# Patient Record
Sex: MALE | NOT HISPANIC OR LATINO | URBAN - METROPOLITAN AREA
[De-identification: names, ages, dates, MRNs, and addresses within clinical notes are randomized per-mention and may not be internally consistent; named-entity substitution may affect disease eponyms.]

---
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
## 2022-02-12 VITALS
DIASTOLIC BLOOD PRESSURE: 62 MMHG | WEIGHT: 185.63 LBS | HEART RATE: 75 BPM | TEMPERATURE: 98.8 F | BODY MASS INDEX: 28.04 KG/M2 | WEIGHT: 185 LBS | HEIGHT: 68 IN | OXYGEN SATURATION: 99 % | HEIGHT: 68 IN | SYSTOLIC BLOOD PRESSURE: 108 MMHG | HEART RATE: 88 BPM | WEIGHT: 185 LBS | DIASTOLIC BLOOD PRESSURE: 82 MMHG | BODY MASS INDEX: 28.04 KG/M2 | BODY MASS INDEX: 28.13 KG/M2 | OXYGEN SATURATION: 98 % | SYSTOLIC BLOOD PRESSURE: 122 MMHG | HEIGHT: 68 IN | HEART RATE: 80 BPM

## 2022-02-12 VITALS
OXYGEN SATURATION: 96 % | HEART RATE: 70 BPM | HEIGHT: 67 IN | HEIGHT: 67 IN | WEIGHT: 205 LBS | SYSTOLIC BLOOD PRESSURE: 110 MMHG | DIASTOLIC BLOOD PRESSURE: 70 MMHG | HEIGHT: 68 IN | OXYGEN SATURATION: 97 % | HEART RATE: 101 BPM | HEART RATE: 58 BPM | OXYGEN SATURATION: 99 % | WEIGHT: 199.74 LBS | BODY MASS INDEX: 31.35 KG/M2 | TEMPERATURE: 98.8 F | BODY MASS INDEX: 29.1 KG/M2 | DIASTOLIC BLOOD PRESSURE: 70 MMHG | BODY MASS INDEX: 32.18 KG/M2 | TEMPERATURE: 97.7 F | WEIGHT: 192 LBS | SYSTOLIC BLOOD PRESSURE: 104 MMHG

## 2022-02-12 VITALS
WEIGHT: 192.8 LBS | HEIGHT: 68 IN | DIASTOLIC BLOOD PRESSURE: 70 MMHG | TEMPERATURE: 97.8 F | BODY MASS INDEX: 30.26 KG/M2 | SYSTOLIC BLOOD PRESSURE: 110 MMHG | OXYGEN SATURATION: 97 % | OXYGEN SATURATION: 98 % | HEART RATE: 83 BPM | TEMPERATURE: 97.6 F | HEART RATE: 71 BPM | BODY MASS INDEX: 30.2 KG/M2 | HEIGHT: 67 IN | WEIGHT: 199.3 LBS

## 2022-02-12 VITALS
SYSTOLIC BLOOD PRESSURE: 100 MMHG | WEIGHT: 183.86 LBS | SYSTOLIC BLOOD PRESSURE: 118 MMHG | DIASTOLIC BLOOD PRESSURE: 64 MMHG | HEART RATE: 61 BPM | TEMPERATURE: 97.8 F | HEIGHT: 68 IN | BODY MASS INDEX: 27.93 KG/M2 | HEIGHT: 68 IN | WEIGHT: 184.3 LBS | TEMPERATURE: 98.2 F | BODY MASS INDEX: 27.87 KG/M2 | HEART RATE: 69 BPM | DIASTOLIC BLOOD PRESSURE: 64 MMHG

## 2022-02-12 VITALS
HEART RATE: 114 BPM | WEIGHT: 209.44 LBS | WEIGHT: 205.47 LBS | SYSTOLIC BLOOD PRESSURE: 116 MMHG | DIASTOLIC BLOOD PRESSURE: 70 MMHG | BODY MASS INDEX: 31.14 KG/M2 | HEART RATE: 84 BPM | OXYGEN SATURATION: 93 % | BODY MASS INDEX: 31.74 KG/M2 | HEIGHT: 68 IN | DIASTOLIC BLOOD PRESSURE: 80 MMHG | TEMPERATURE: 98.4 F | HEIGHT: 68 IN | SYSTOLIC BLOOD PRESSURE: 120 MMHG

## 2022-02-12 VITALS
HEIGHT: 67 IN | DIASTOLIC BLOOD PRESSURE: 64 MMHG | TEMPERATURE: 97.6 F | WEIGHT: 205.03 LBS | BODY MASS INDEX: 32.18 KG/M2 | SYSTOLIC BLOOD PRESSURE: 118 MMHG

## 2022-02-12 VITALS
TEMPERATURE: 98.2 F | SYSTOLIC BLOOD PRESSURE: 128 MMHG | BODY MASS INDEX: 30.55 KG/M2 | HEART RATE: 78 BPM | DIASTOLIC BLOOD PRESSURE: 80 MMHG | HEIGHT: 67 IN | WEIGHT: 194.67 LBS

## 2022-02-15 NOTE — NURSING NOTE
Generalized Anxiety Disorder Screening Entered On:  3/22/2019 11:38 AM CDT    Performed On:  3/22/2019 11:38 AM CDT by Chapis Felix CMA               Generalized Anxiety Disorder Screening   WILLIE Nervous, Anxious On Edge :   Several days   WILLIE Control Worrying B :   More than half the days   WILLIE Worrying Too Much :   More than half the days   WILLIE Restless :   More than half the days   WILLIE Easily Annoyed/Irritable :   More than half the days   WILLIE Afraid :   Nearly every day   WILLIE Trouble Relaxing :   Nearly every day   WILLIE Total Screening Score :   15    WILLIE Difficulty with Work, Home, Others :   Somewhat difficult   Chapis Felix CMA - 3/22/2019 11:38 AM CDT

## 2022-02-15 NOTE — PROGRESS NOTES
Patient:   MARY BETH DENSON            MRN: [id number]            FIN: [id number]               Age:   19 years     Sex:  Male     :  2000   Associated Diagnoses:   Acute urticaria   Author:   Gerardo Yost MD      Impression and Plan   Diagnosis     Acute urticaria (NQM61-EC L50.8).     Course:  Worsening.    Plan:    1. Observe for possible allergy exposures  2. Diphenhydramine or Hydroxyzine PRN for itching  3. Follow up for allergy consult if case becomes chronic (greater than two weeks)  4. Go to ER if respiratory or life threatening symptoms develop.    Orders     Orders   Charges (Evaluation and Management):  78189 office outpatient visit 15 minutes (Charge) (Order): Quantity: 1, Acute urticaria.     Orders (Selected)   Prescriptions  Prescribed  betamethasone valerate 0.1% topical cream: 1 shantal, TOP, BID, # 45 g, 1 Refill(s), Type: Maintenance, Pharmacy: [facility], 1 shantal Topical bid  hydrOXYzine hydrochloride 25 mg oral tablet: = 1 tab(s) ( 25 mg ), Oral, q4-6 hrs, PRN: as needed for itching, # 25 tab(s), 1 Refill(s), Type: Maintenance, Pharmacy: [facility], 1 tab(s) Oral q4-6 hrs,PRN:as needed for itching.        Visit Information      Date of Service: 10/01/2019 09:40 am  Performing Location: [facility]  Encounter#: [id number]      Primary Care Provider (PCP):  Gerardo Yost MD    NPI# [id number]      Referring Provider:  Gerardo Yost MD, NPI# [id number]   Visit type:  New symptom.    Accompanied by:  Mother.    Source of history:  Self, Mother.    History limitation:  None.       Chief Complaint   10/1/2019 9:48 AM CDT    Pt here for rash/hives        History of Present Illness             The patient presents with generalized hives.  Exacerbating factors consist of allergen exposure.  Relieving factors consist of antihistamine and cortisone cream.  Associated symptoms consist of none.        Review of Systems   Constitutional:  Negative.    Eye:  Negative.     Ear/Nose/Mouth/Throat:  Negative.    Respiratory:  Negative.    Cardiovascular:  Negative.    Gastrointestinal:  Negative.    Genitourinary:  Negative.    Hematology/Lymphatics:  Negative.    Endocrine:  Negative.    Immunologic:  Negative.    Musculoskeletal:  Negative.    Integumentary:  Negative except as documented in history of present illness.    Neurologic:  Negative.    Psychiatric:  Negative.    All other systems reviewed and negative      Health Status   Allergies:    Allergic Reactions (Selected)  Severity Not Documented  Augmentin (Hives)   Medications:  (Selected)   Prescriptions  Prescribed  Wellbutrin  mg/24 hours oral tablet, extended release: = 1 tab(s) ( 150 mg ), Oral, q 24 hrs, # 30 tab(s), 1 Refill(s), Type: Maintenance, Pharmacy: [facility], 1 tab(s) Oral q 24 hrs  betamethasone valerate 0.1% topical cream: 1 shantal, TOP, BID, # 45 g, 1 Refill(s), Type: Maintenance, Pharmacy: [facility], 1 shantal Topical bid  hydrOXYzine hydrochloride 25 mg oral tablet: = 1 tab(s) ( 25 mg ), Oral, q4-6 hrs, PRN: as needed for itching, # 25 tab(s), 1 Refill(s), Type: Maintenance, Pharmacy: [facility], 1 tab(s) Oral q4-6 hrs,PRN:as needed for itching   Problem list:    All Problems  Obesity / SNOMED CT 6904035472 / Probable  Severe major depressive disorder / SNOMED CT 3999417881 / Confirmed      Histories   Past Medical History:    No active or resolved past medical history items have been selected or recorded.   Family History:    No family history items have been selected or recorded.   Procedure history:    No active procedure history items have been selected or recorded.   Social History:             No active social history items have been recorded.      Physical Examination   Vital Signs   10/1/2019 9:48 AM CDT Temperature Tympanic 97.7 DegF  LOW    Peripheral Pulse Rate 58 bpm    Oxygen Saturation 99 %      Measurements from flowsheet : Measurements   10/1/2019 9:48 AM CDT Height  Measured - Standard 67 in    Weight Measured - Standard 205 lb    BSA 2.09 m2    Body Mass Index 32.1 kg/m2    Body Mass Index Percentile 98.31    Height/Length Percentile 0.00      General:  Alert and oriented X 3, No acute distress, Warm, Pink, Intact.         Appearance: Within normal limits, Well nourished, Calm.         Hydration: Within normal limits.         Psych: Within normal limits, Appropriate mood and affect, Cooperative, Normal judgment.    Integumentary:  scattered wheel and flare lesions over entire body.

## 2022-02-15 NOTE — LETTER
(Inserted Image. Unable to display)   March 20, 2019      MARY BETH DENSON       [place], [place] [id number]        Dear MARY BETH,    Thank you for selecting [facility] for your healthcare needs.  Below you will find the results of your recent tests done at our clinic.      Mary Beth's labs are acceptable.  His triglycerides are a bit high, so I would recommend he start over the counter fish oil supplement 2 grams per day.  His HDL is a bit low- this is the good cholesterol so we want it a bit higher- the best way to do this is get more physical activity.        Result Name Current Result Reference Range   Hgb A1c  5.6 3/19/2019  - <5.7   Cholesterol (mg/dL)  161 3/19/2019  - <170   Non-HDL Cholesterol ((H)) 127 3/19/2019  - <120   HDL (mg/dL) ((L)) 34 3/19/2019 >45 -    Cholesterol/HDL Ratio  4.7 3/19/2019  - <5.0   LDL  101 3/19/2019  - <110   Triglyceride (mg/dL) ((H)) 154 3/19/2019  - <90   T4 Free (ng/dL)  0.9 3/19/2019 0.8 - 1.4   TSH (mIU/L)  3.78 3/19/2019 0.50 - 4.30       Please contact me or my assistant at [phone] if you have any questions.     Sincerely,        Gosia Tracey MD

## 2022-02-15 NOTE — PROGRESS NOTES
Patient:   MARY BETH DENSON            MRN: [id number]            FIN: [id number]               Age:   18 years     Sex:  Male     :  2000   Associated Diagnoses:   Severe major depressive disorder   Author:   Gosia Tracey MD      Chief Complaint   6/3/2019 2:11 PM CDT     Patient presents for depression medication check and renewal of medication.      History of Present Illness   Chief complaint and symptoms as noted above and confirmed with patient.  Here today with mom.  Has been off medication for about a week.  Mom notices off the medication patience is lower.  Is more angry.  Sleep without the medication is better.  Doesn't like the morning effects.  Takes the Remeron- hasn't been taking for a long time.  Was taking at 9:30pm or 10:30pm.  Without Remeron does not have troubles falling asleep or staying asleep.  Bedtime is at 10pm.  Up for school 725 am.  Sat [place] [employer].  Sometimes not getting into bed 12am.  Does have troubles falling asleep- has always been the case.  Normally does not dinner.  Just eats lunch.  This past Friday cut on his arm with a razor blade.  Mom administers medication.  No access to firearms.      Review of Systems   All other systems are negative      Health Status   Allergies:    Allergic Reactions (Selected)  Severity Not Documented  Augmentin (Hives)   Medications:  (Selected)   Prescriptions  Prescribed  mirtazapine 15 mg oral tablet: = 1 tab(s) ( 15 mg ), PO, Once a day (at bedtime), # 30 tab(s), 1 Refill(s), Type: Maintenance, Pharmacy: [facility], 1 tab(s) Oral hs  venlafaxine 75 mg oral capsule, extended release: = 1 cap(s) ( 75 mg ), PO, Daily, # 30 cap(s), 1 Refill(s), Type: Maintenance, Pharmacy: [facility], 1 cap(s) Oral daily   Problem list:    All Problems  Obesity / 3744877103 / Probable  Severe major depressive disorder / 0323336688 / Confirmed      Histories   Past Medical History:    No active or resolved past medical history items  have been selected or recorded.   Family History:    No family history items have been selected or recorded.   Procedure history:    No active procedure history items have been selected or recorded.   Social History:             No active social history items have been recorded.      Physical Examination   Vital Signs   6/3/2019 2:11 PM CDT Temperature Tympanic 98.4 DegF    Peripheral Pulse Rate 84 bpm    HR Method Electronic    Systolic Blood Pressure 120 mmHg    Diastolic Blood Pressure 70 mmHg    Mean Arterial Pressure 87 mmHg    BP Site Right arm    BP Method Manual      Measurements from flowsheet : Measurements   6/3/2019 2:11 PM CDT Height Measured - Metric 171.45 cm    Weight Measured - Metric 95 kg    BSA - Metric 2.13 m2    Body Mass Index - Metric 32.32 kg/m2    Body Mass Index Percentile 98.47      Vital signs as noted above   General:  Alert and oriented.    Respiratory:  Lungs clear to auscultation bilaterally.  Equal air entry.  Symmetrical chest expansion.  No wheezing.  .    Cardiovascular:  S1 and S2 with regular rate and rhythm.  No murmurs.  Pulses 2+ in all four extremities.  Brisk capillary refill.  .    Psychiatric:  Cooperative, Appropriate mood & affect, Normal judgment, Non-suicidal, Good eye contact.       Review / Management   PHQ-A:   9/27.  Questions 1,2,9 1 each.       Impression and Plan   Diagnosis     Severe major depressive disorder (KQU54-KG F32.2).     Plan:  Discussed I need him to be more consistent with taking his medication and knowing what time he takes it to be able to adjust timing of it so he is not tired in the morning.  Recommend daily dinner meals with parents at the table and administering Remeron at that meal.  Should then document what time he begins to feel tired.  Do this consistently for at least a week and family may call for an update otherwise over the next month collect data and return to clinic in 1 month for recheck.  Consider sleep study if symptoms are  ongoing.  Continue Remeron 15 mg at bedtime.  Continue venlafaxine 75 mg daily in the morning.  Refill sent to the pharmacy and he should return in 4 weeks for recheck, sooner if needed.   .    Orders     Orders (Selected)   Prescriptions  Prescribed  mirtazapine 15 mg oral tablet: = 1 tab(s) ( 15 mg ), PO, Once a day (at bedtime), # 30 tab(s), 1 Refill(s), Type: Maintenance, Pharmacy: [facility], 1 tab(s) Oral hs  venlafaxine 75 mg oral capsule, extended release: = 1 cap(s) ( 75 mg ), PO, Daily, # 30 cap(s), 1 Refill(s), Type: Maintenance, Pharmacy: [facility], 1 cap(s) Oral daily.

## 2022-02-15 NOTE — PROGRESS NOTES
Patient:   MARY BETH DENSON            MRN: [id number]            FIN: [id number]               Age:   19 years     Sex:  Male     :  2000   Associated Diagnoses:   Moderate persistent asthma; Severe major depressive disorder; Tobacco user   Author:   Gosia Tracey MD      Chief Complaint   1/10/2020 1:48 PM CST    Pt here today for med check.      History of Present Illness   Chief complaint and symptoms as noted above and confirmed with patient.  Here today with mom for follow-up.    1.  Has had some ongoing cough at night.  Is at least 2 times per week.  Has been participating in wrestling and noted he needs to use his albuterol more frequently.  Has a history of asthma.  Is also smoking tobacco products.  Smokes about 3 cigarettes/day.  States it is not always daily.  Cannot name why he does it.    2. From a depression standpoint has run out of his Wellbutrin.  Has been off of this for a little while.  Did think it was making a difference.  Grades are excellent right now A s and B s.  Does not use the hydroxyzine at all as he does not like that it makes him tired.  Is going to bed between 1030 and 11.  No troubles falling asleep or staying asleep.  Just gets to bed late related to homework/ wrestling and not going to bed as early as he should.              Review of Systems   Constitutional:  Negative.    Eye:  Negative.    Ear/Nose/Mouth/Throat:  Negative.    Respiratory:  Negative except as documented in history of present illness.    Cardiovascular:  Negative.    Gastrointestinal:  Negative.    Psychiatric:  Negative except as documented in history of present illness.       Health Status   Allergies:    Allergic Reactions (Selected)  Severity Not Documented  Augmentin (Hives)   Medications:  (Selected)   Prescriptions  Prescribed  ProAir HFA 90 mcg/inh inhalation aerosol: 2 puff(s), inh, qid, # 1 EA, 5 Refill(s), Type: Maintenance, Pharmacy: [facility], 2 puff(s) Inhale qid  Wellbutrin   mg/24 hours oral tablet, extended release: = 1 tab(s) ( 300 mg ), Oral, q 24 hrs, # 30 tab(s), 1 Refill(s), Type: Maintenance, Pharmacy: [facility], 1 tab(s) Oral q 24 hrs  hydrOXYzine hydrochloride 25 mg oral tablet: = 1 tab(s) ( 25 mg ), Oral, q4-6 hrs, PRN: as needed for itching, # 25 tab(s), 1 Refill(s), Type: Maintenance, Pharmacy: [facility], 1 tab(s) Oral q4-6 hrs,PRN:as needed for itching   Problem list:    All Problems  Obesity / 6450038242 / Probable  Severe major depressive disorder / 0473384679 / Confirmed      Histories   Past Medical History:    No active or resolved past medical history items have been selected or recorded.   Family History:    No family history items have been selected or recorded.   Procedure history:    No active procedure history items have been selected or recorded.   Social History:             No active social history items have been recorded.      Physical Examination   Vital Signs   1/10/2020 1:48 PM CST Peripheral Pulse Rate 88 bpm    HR Method Manual    Systolic Blood Pressure 108 mmHg    Diastolic Blood Pressure 82 mmHg    Mean Arterial Pressure 91 mmHg    BP Site Right arm    BP Method Manual      Measurements from flowsheet : Measurements   1/10/2020 1:48 PM CST Height Measured - Metric 171.5 cm    Weight Measured - Metric 84.2 kg    BSA - Metric 2 m2    Body Mass Index - Metric 28.63 kg/m2    Body Mass Index Percentile 95.13      Vital signs as noted above   General:  Alert and oriented.    Eye:  Pupils are equal, round and reactive to light, Extraocular movements are intact.    HENT:  Tympanic membranes are clear, Oral mucosa is moist, No pharyngeal erythema.    Neck:  No lymphadenopathy.    Respiratory:  Lungs clear to auscultation bilaterally.  Equal air entry.  Symmetrical chest expansion.  No wheezing.  .    Cardiovascular:  S1 and S2 with regular rate and rhythm.  No murmurs.  Pulses 2+ in all four extremities.  Brisk capillary refill.  .        Review / Management   ACT: 10 total, no hospitalizations or ER visits.  Robinson 7: 15, very difficult.  PHQ A: 15/27.  Spirometry: FEV1\ FVC: 74%.  FEF 25-75%: 69% of predicted.  Abnormal flow volume loop.      Impression and Plan   Diagnosis     Moderate persistent asthma (GBE01-KH J45.40).     Severe major depressive disorder (RKI96-GK F32.2).     Tobacco user (INX18-XO Z72.0).     Plan:  We will start Advair Diskus twice daily.  Should decrease albuterol use as able.  Recommend so smoking cessation.  We will discontinue use of hydroxyzine as he has not found it helpful.  Continue Wellbutrin 300 mg XR.  Return to clinic in 2 months for recheck, sooner if needed..    Orders     Orders (Selected)   Prescriptions  Prescribed  Advair Diskus 250 mcg-50 mcg inhalation powder: 1 puff(s), Inhale, bid, # 180 EA, 2 Refill(s), Type: Maintenance, Pharmacy: [facility], 1 puff(s) Inhale bid  Wellbutrin  mg/24 hours oral tablet, extended release: = 1 tab(s) ( 300 mg ), Oral, q 24 hrs, # 30 tab(s), 3 Refill(s), Type: Maintenance, Pharmacy: [facility], 1 tab(s) Oral q 24 hrs.

## 2022-02-15 NOTE — PROGRESS NOTES
Patient:   MARY BETH DENSON            MRN: [id number]            FIN: [id number]               Age:   19 years     Sex:  Male     :  2000   Associated Diagnoses:   Impetigo contagiosa   Author:   Gerardo Yost MD      Impression and Plan   Diagnosis     Impetigo contagiosa (UPD84-UK L01.00).     Course:  Worsening.    Orders     Orders   Charges (Evaluation and Management):  98926 office outpatient visit 15 minutes (Charge) (Order): Quantity: 1, Impetigo contagiosa.     Orders (Selected)   Prescriptions  Prescribed  cephalexin 500 mg oral capsule: = 1 cap(s) ( 500 mg ), PO, tid, # 30 cap(s), 0 Refill(s), Type: Maintenance, Pharmacy: [facility], 1 cap(s) Oral tid,x10 day(s)  mupirocin 2% topical cream: 1 shantal, Topical, tid, # 30 gm, 0 Refill(s), Type: Acute, Pharmacy: [facility], 1 shantal Topical tid.        Visit Information      Date of Service: 2020 04:14 pm  Performing Location: [facility]  Encounter#: [id number]      Primary Care Provider (PCP):  Gerardo Yost MD    NPI# [id number]      Referring Provider:  Gerardo Yost MD, NPI# [id number]   Visit type:  New symptom.    Accompanied by:  Family member.    Source of history:  Self, Family member.    History limitation:  None.       Chief Complaint   2020 4:15 PM CST    Pt here for rash        History of Present Illness             The patient presents with skin lesion(s).  The location of the skin lesion is the right face.  The skin lesion is described as a single lesion, red, oozing tenderness.  The severity of symptom(s) associated to the skin lesion is mild.  The patient noticed the skin lesion 1 day(s) ago.  The skin lesion is worsening.  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  There are no modifying factors.        Review of Systems   Constitutional:  Negative.    Eye:  Negative.    Ear/Nose/Mouth/Throat:  Negative.    Respiratory:  Negative.     Cardiovascular:  Negative.    Gastrointestinal:  Negative.    Genitourinary:  Negative.    Hematology/Lymphatics:  Negative.    Endocrine:  Negative.    Immunologic:  Negative.    Musculoskeletal:  Negative.    Integumentary:  Negative except as documented in history of present illness.    Neurologic:  Negative.    Psychiatric:  Negative.    All other systems reviewed and negative      Health Status   Allergies:    Allergic Reactions (Selected)  Severity Not Documented  Augmentin (Hives)   Medications:  (Selected)   Prescriptions  Prescribed  Advair Diskus 250 mcg-50 mcg inhalation powder: 1 puff(s), Inhale, bid, # 180 EA, 2 Refill(s), Type: Maintenance, Pharmacy: [facility], 1 puff(s) Inhale bid  ProAir HFA 90 mcg/inh inhalation aerosol: 2 puff(s), inh, qid, # 1 EA, 5 Refill(s), Type: Maintenance, Pharmacy: [facility], 2 puff(s) Inhale qid  Wellbutrin  mg/24 hours oral tablet, extended release: = 1 tab(s) ( 300 mg ), Oral, q 24 hrs, # 30 tab(s), 3 Refill(s), Type: Maintenance, Pharmacy: [facility], 1 tab(s) Oral q 24 hrs  cephalexin 500 mg oral capsule: = 1 cap(s) ( 500 mg ), PO, tid, # 30 cap(s), 0 Refill(s), Type: Maintenance, Pharmacy: [facility], 1 cap(s) Oral tid,x10 day(s)  mupirocin 2% topical cream: 1 shantal, Topical, tid, # 30 gm, 0 Refill(s), Type: Acute, Pharmacy: [facility], 1 shantal Topical tid   Problem list:    All Problems  Moderate persistent asthma / SNOMED CT 5680830790 / Confirmed  Obesity / SNOMED CT 8835294569 / Probable  Severe major depressive disorder / SNOMED CT 4024771979 / Confirmed  Tobacco user / SNOMED CT 001268101 / Confirmed      Histories   Past Medical History:    No active or resolved past medical history items have been selected or recorded.   Family History:    No family history items have been selected or recorded.   Procedure history:    No active procedure history items have been selected or recorded.   Social History:             No  active social history items have been recorded.      Physical Examination   Vital Signs   1/29/2020 4:15 PM CST Temperature Tympanic 98.8 DegF    Peripheral Pulse Rate 80 bpm    Oxygen Saturation 98 %      Measurements from flowsheet : Measurements   1/29/2020 4:15 PM CST Height Measured - Standard 68 in    Weight Measured - Standard 185 lb    BSA 2 m2    Body Mass Index 28.13 kg/m2    Body Mass Index Percentile 94.18      General:  Alert and oriented X 3, No acute distress, Warm, Pink, Intact.         Appearance: Within normal limits, Well nourished, Calm.         Hydration: Within normal limits.         Psych: Within normal limits, Appropriate mood and affect, Cooperative, Normal judgment.    Integumentary:  pustular skin lesion in right eyebrow consistent with bacterial infection.

## 2022-02-15 NOTE — NURSING NOTE
Generalized Anxiety Disorder Screening Entered On:  10/9/2019 8:09 AM CDT    Performed On:  10/4/2019 8:08 AM CDT by Gladys Romero               Generalized Anxiety Disorder Screening   WILLIE Nervous, Anxious On Edge :   More than half the days   WILLIE Control Worrying B :   Several days   WILLIE Worrying Too Much :   More than half the days   WILLIE Restless :   More than half the days   WILLIE Easily Annoyed/Irritable :   Several days   WILLIE Afraid :   Several days   WILLIE Trouble Relaxing :   Nearly every day   WILLIE Total Screening Score :   12    WILLIE Difficulty with Work, Home, Others :   Very difficult   Gladys Romero - 10/9/2019 8:08 AM CDT

## 2022-02-15 NOTE — NURSING NOTE
Depression Screening Entered On:  8/16/2019 9:00 AM CDT    Performed On:  8/13/2019 9:00 AM CDT by Gladys Romero               Depression Screening   Little Interest - Pleasure in Activities :   More than half the days   Feeling Down, Depressed, Hopeless :   More than half the days   Initial Depression Screen Score :   4    Trouble Falling or Staying Asleep :   More than half the days   Feeling Tired or Little Energy :   Several days   Poor Appetite or Overeating :   Nearly every day   Feeling Bad About Yourself :   Nearly every day   Trouble Concentrating :   Several days   Moving or Speaking Slowly :   Not at all   Thoughts Better Off Dead or Hurting Self :   Several days   Detailed Depression Screen Score :   11    Total Depression Screen Score :   15    Gladys Romero - 8/16/2019 9:00 AM CDT

## 2022-02-15 NOTE — NURSING NOTE
Depression Screening Entered On:  6/6/2019 3:52 PM CDT    Performed On:  6/3/2019 3:48 PM CDT by Gladys Romero               Depression Screening   Little Interest - Pleasure in Activities :   Several days   Feeling Down, Depressed, Hopeless :   Several days   Initial Depression Screen Score :   2    Trouble Falling or Staying Asleep :   More than half the days   Poor Appetite or Overeating :   More than half the days   Feeling Bad About Yourself :   Several days   Trouble Concentrating :   Several days   Moving or Speaking Slowly :   Not at all   Thoughts Better Off Dead or Hurting Self :   Several days   Gladys Romero - 6/6/2019 3:48 PM CDT

## 2022-02-15 NOTE — NURSING NOTE
Generalized Anxiety Disorder Screening Entered On:  2/25/2019 11:31 AM CST    Performed On:  2/22/2019 11:29 AM CST by Norma Caballero               Generalized Anxiety Disorder Screening   WILLIE Nervous, Anxious On Edge :   More than half the days   WILLIE Control Worrying B :   More than half the days   WILLIE Worrying Too Much :   More than half the days   WILLIE Restless :   More than half the days   WILLIE Easily Annoyed/Irritable :   Several days   WILLIE Afraid :   More than half the days   WILLIE Trouble Relaxing :   More than half the days   WILLIE Total Screening Score :   13    WILLIE Difficulty with Work, Home, Others :   Somewhat difficult   Norma Caballero - 2/25/2019 11:29 AM CST

## 2022-02-15 NOTE — PROGRESS NOTES
Patient:   MARY BETH DENSON            MRN: [id number]            FIN: [id number]               Age:   19 years     Sex:  Male     :  2000   Associated Diagnoses:   Smoker; Severe major depressive disorder; Immunization due   Author:   Gosia Tracey MD      Chief Complaint   10/4/2019 2:23 PM CDT    2 week follow up. feels it is still the same. could still be better.      History of Present Illness   Chief complaint and symptoms as noted above and confirmed with patient.  Here today with mom.  Mom thought was a rough switch.  He had some cutting.  Since then seems to have mellowed out but still feels it could be better. Is using the hydroxyzine every other day or some days.  Sometimes this makes him tired.  Therapist visits at school and hasn't shown up yet.  Next Tuesday will see him again at school.  Not hanging out much with friends.  Tobias his cousin are friends.  Has a second job [employer].  Is a senior this year.  Has talked about [employer] or tech school when he is done.    Sleep is going well once he falls asleep.  Does snore.  Feels tired during the school day, falling asleep in English class regularly.    Is often not hungry and won't eat with mom.   Still smoking cigarettes.        Review of Systems   Constitutional:  Negative.    Eye:  Negative.    Ear/Nose/Mouth/Throat:  Negative.    Respiratory:  Negative.    Cardiovascular:  Negative.    Integumentary:  Recent case of hives, saw Dr. Yost and these are now improved.    Psychiatric:  Negative except as documented in history of present illness.       Health Status   Allergies:    Allergic Reactions (Selected)  Severity Not Documented  Augmentin (Hives)   Medications:  (Selected)   Prescriptions  Prescribed  Wellbutrin  mg/24 hours oral tablet, extended release: = 1 tab(s) ( 150 mg ), Oral, q 24 hrs, # 30 tab(s), 1 Refill(s), Type: Maintenance, Pharmacy: [facility], 1 tab(s) Oral q 24 hrs  betamethasone valerate 0.1% topical cream: 1  shantal, TOP, BID, # 45 g, 1 Refill(s), Type: Maintenance, Pharmacy: [facility], 1 shantal Topical bid  hydrOXYzine hydrochloride 25 mg oral tablet: = 1 tab(s) ( 25 mg ), Oral, q4-6 hrs, PRN: as needed for itching, # 25 tab(s), 1 Refill(s), Type: Maintenance, Pharmacy: [facility], 1 tab(s) Oral q4-6 hrs,PRN:as needed for itching   Problem list:    All Problems  Obesity / 2239162612 / Probable  Severe major depressive disorder / [id number] / Confirmed      Histories   Past Medical History:    No active or resolved past medical history items have been selected or recorded.   Family History:    No family history items have been selected or recorded.   Procedure history:    No active procedure history items have been selected or recorded.   Social History:             No active social history items have been recorded.      Physical Examination   Vital Signs   10/4/2019 2:23 PM CDT Peripheral Pulse Rate 101 bpm  HI    HR Method Electronic    Systolic Blood Pressure 110 mmHg    Diastolic Blood Pressure 70 mmHg    Mean Arterial Pressure 83 mmHg    BP Site Right arm    BP Method Manual    Oxygen Saturation 96 %      Measurements from flowsheet : Measurements   10/4/2019 2:23 PM CDT Height Measured - Metric 171 cm    Weight Measured - Metric 90.6 kg    BSA - Metric 2.07 m2    Body Mass Index - Metric 30.98 kg/m2      Vital signs as noted above   General:  Alert and oriented, Fair to poor eye contact. Fidgets with hands.    Respiratory:  Lungs clear to auscultation bilaterally.  Equal air entry.  Symmetrical chest expansion.  No wheezing.  .    Cardiovascular:  S1 and S2 with regular rate and rhythm.  No murmurs.  Pulses 2+ in all four extremities.  Brisk capillary refill.  .       Review / Management   Robinson 7: 12, very difficult.  PHQ 9: 18/27.  Question 9 is a 1, question 1 is a 3, question 2 is a 2.      Impression and Plan   Diagnosis     Smoker (USN81-UX F17.200).     Severe major depressive disorder (SOP71-QE  F32.2).     Immunization due (QKK61-YR Z23).     Plan:  We will increase Wellbutrin to 300 mg once daily.  Continue the hydroxyzine as needed.  Encouraged him to have a meal with his mother 1-2 times per week.  Encouraged him to notify his mother where he is going, who he is with, what he will be doing, and when he will return home.  We will have him get in with Dr. Hood for a sleep study.  Influenza vaccine given today.  Return to clinic in 4 weeks for recheck.  .    Orders     Orders (Selected)   Prescriptions  Prescribed  Wellbutrin  mg/24 hours oral tablet, extended release: = 1 tab(s) ( 300 mg ), Oral, q 24 hrs, # 30 tab(s), 1 Refill(s), Type: Maintenance, Pharmacy: [facility], 1 tab(s) Oral q 24 hrs.

## 2022-02-15 NOTE — PROGRESS NOTES
Patient:   MARY BETH DENSON            MRN: [id number]            FIN: [id number]               Age:   18 years     Sex:  Male     :  2000   Associated Diagnoses:   Hospital discharge follow-up; Overweight child with body mass index (BMI) > 99% for age; Severe major depressive disorder   Author:   Gosia Tracey MD      Chief Complaint   3/19/2019 3:41 PM CDT    medication check, labs, follow up hospital.      History of Present Illness   Chief complaint and symptoms as noted above and confirmed with patient.  Here today with mom for follow up.  Was admitted on .  Anxiety was really bad and got very depressed.  Was at [facility] for about a week.  Worked on coping skills and how to stay positive.  Since discharge he states the morning medication doesn't seem to be doing much.  The night medication helps but still having some difficulty falling asleep.  IS drowsy.  Mom thinks could be related to some screen use.  Does try to turn this off for an hour first.  Doing homework until 1130 and then takes medication and then laying down.  Not working this month.  Mom is worried about his thyroid.  Was elevated at the time of admission.    Hypothyroidism- mom, GM and mat aunt.    Still with the same girlfriend.        Review of Systems   All other systems are negative      Health Status   Allergies:    Allergic Reactions (Selected)  Severity Not Documented  Augmentin (Hives)   Medications:  (Selected)   Documented Medications  Documented  mirtazapine 15 mg oral tablet: = 1 tab(s) ( 15 mg ), PO, Once a day (at bedtime), # 30 tab(s), 0 Refill(s), Type: Maintenance  venlafaxine 37.5 mg oral capsule, extended release: = 1 cap(s) ( 37.5 mg ), PO, Daily, # 30 cap(s), 0 Refill(s), Type: Maintenance   Problem list:    All Problems  Obesity / [id number] / Probable      Histories   Past Medical History:    No active or resolved past medical history items have been selected or recorded.   Family History:    No family  history items have been selected or recorded.   Procedure history:    No active procedure history items have been selected or recorded.   Social History:             No active social history items have been recorded.      Physical Examination   Vital Signs   3/19/2019 3:41 PM CDT Temperature Tympanic 97.6 DegF  LOW    Pulse Site Radial artery    HR Method Manual    Systolic Blood Pressure 118 mmHg    Diastolic Blood Pressure 64 mmHg    Mean Arterial Pressure 82 mmHg    BP Site Right arm    BP Method Manual      Measurements from flowsheet : Measurements   3/19/2019 3:41 PM CDT Height Measured - Metric 170 cm    Weight Measured - Metric 93 kg    BSA - Metric 2.1 m2    Body Mass Index - Metric 32.18 kg/m2    Body Mass Index Percentile 98.47      Vital signs as noted above   General:  Alert and oriented.    Neck:  No thyromegaly.    Respiratory:  Lungs clear to auscultation bilaterally.  Equal air entry.  Symmetrical chest expansion.  No wheezing.  .    Cardiovascular:  S1 and S2 with regular rate and rhythm.  No murmurs.  Pulses 2+ in all four extremities.  Brisk capillary refill.  .       Review / Management   WILLIE 7:  15  PHQ-A:  21/27      Impression and Plan   Diagnosis     Hospital discharge follow-up (CKW68-TP Z09).     Overweight child with body mass index (BMI) > 99% for age (SII35-VO E66.3).     Severe major depressive disorder (OBQ94-ZB F32.2).     Plan:  Will increase his Effexor to 75mg daily.   Continue nighttime Remeron.   Discussed pushing back his bedtime, reading before bed, and no screens 1 hour prior to bedtime.   RTC 4 weeks, sooner if needed. .    Orders     Orders (Selected)   Prescriptions  Prescribed  mirtazapine 15 mg oral tablet: = 1 tab(s) ( 15 mg ), PO, Once a day (at bedtime), # 30 tab(s), 1 Refill(s), Type: Maintenance, Pharmacy: [facility], 1 tab(s) Oral hs  venlafaxine 75 mg oral capsule, extended release: = 1 cap(s) ( 75 mg ), PO, Daily, # 30 cap(s), 1 Refill(s), Type:  Maintenance, Pharmacy: [facility], 1 cap(s) Oral daily.

## 2022-02-15 NOTE — PROGRESS NOTES
Patient:   MARY BETH DENSON            MRN: [id number]            FIN: [id number]               Age:   18 years     Sex:  Male     :  2000   Associated Diagnoses:   Depression   Author:   Gosia Tracey MD      Chief Complaint   2018 2:06 PM CDT    Patient presents for depression medication check.      History of Present Illness   Chief complaint and symptoms as noted above and confirmed with patient.  Here today with mom for recheck on depression medication.  Overall feels that the increase in fluoxetine is actually made him a bit worse.  Thinks he is short with people.  Did start a new job at [employer] and this is going well.  Has been doing okay in school.  Still having troubles with sleeping.  Specifically falling asleep.  Goes to bed around 1030 or 11 but is not falling asleep until sometimes after midnight.  Does have a TV in his room.  Mom reports he is often watching television when he goes to bed.  Denies suicidal thoughts.       Review of Systems   Integumentary:  Still itching and patches of scaling skin on area between chest and arms near axilla.    All other systems are negative      Health Status   Allergies:    Allergic Reactions (Selected)  Severity Not Documented  Augmentin (Hives)   Medications:  (Selected)   Prescriptions  Prescribed  FLUoxetine 20 mg oral capsule: 1 cap(s) ( 20 mg ), PO, Daily, # 30 cap(s), 1 Refill(s), Type: Maintenance, Pharmacy: [facility], 1 cap(s) Oral daily  triamcinolone 0.025% topical cream: 1 shantal, TOP, bid, Instructions: apply a thin film  to affected area, PRN: eczema, # 60 gm, 0 Refill(s), Type: Maintenance, Pharmacy: [facility], 1 shantal Topical bid,PRN:eczema,Instr:apply a thin film; to affected area   Problem list:    All Problems  Obesity / [id number] / Probable      Histories   Past Medical History:    No active or resolved past medical history items have been selected or recorded.   Family History:    No family history items have been selected  or recorded.   Procedure history:    No active procedure history items have been selected or recorded.   Social History:             No active social history items have been recorded.      Physical Examination   Vital Signs   9/28/2018 2:06 PM CDT Temperature Tympanic 98.2 DegF    Peripheral Pulse Rate 78 bpm    HR Method Electronic    Systolic Blood Pressure 128 mmHg    Diastolic Blood Pressure 80 mmHg    Mean Arterial Pressure 96 mmHg    BP Site Right arm    BP Method Manual      Measurements from flowsheet : Measurements   9/28/2018 2:06 PM CDT Height Measured - Metric 170.18 cm    Weight Measured - Metric 88.3 kg    BSA - Metric 2.04 m2    Body Mass Index - Metric 30.49 kg/m2    Body Mass Index Percentile 97.77      Vital signs as noted above   General:  Alert and oriented.    Eye:  Pupils are equal, round and reactive to light, Extraocular movements are intact.    HENT:  Tympanic membranes are clear, Oral mucosa is moist, No pharyngeal erythema.    Neck:  Few anterior nodes..    Respiratory:  Lungs clear to auscultation bilaterally.  Equal air entry.  Symmetrical chest expansion.  No wheezing.  .    Cardiovascular:  S1 and S2 with regular rate and rhythm.  No murmurs.  Pulses 2+ in all four extremities.  Brisk capillary refill.  .    Integumentary:  Two dry patches on chest near arms. .    Psychiatric:  Cooperative, Appropriate mood & affect.       Review / Management   PHQ A: 9/27.  Slightly higher than last visit.  Robinson 7: 4 total.  Somewhat lower than last visit.      Impression and Plan   Diagnosis     Depression (ZRS36-PZ F32.1).     Plan:  Decrease fluoxetine back to 10 mg once daily.  Start trazodone 50 mg at bedtime.  Encouraged good sleep hygiene.  No electronic devices in the bedroom and to turn everything off 1 hour prior to bedtime.  Return to clinic in 1-2 months.  Mom to call in about 2 weeks if sleep still continues to be an issue and may need to consider increasing the trazodone further.  Flu  vaccine given today..    Orders     Orders (Selected)   Prescriptions  Prescribed  FLUoxetine 10 mg oral capsule: = 1 cap(s) ( 10 mg ), PO, Daily, # 30 cap(s), 1 Refill(s), Type: Maintenance, Pharmacy: [facility], 1 cap(s) Oral daily  traZODone 50 mg oral tablet: = 1 tab(s) ( 50 mg ), PO, hs, # 30 tab(s), 1 Refill(s), Type: Maintenance, Pharmacy: [facility], 1 tab(s) Oral hs.

## 2022-02-15 NOTE — PROGRESS NOTES
Patient:   MARY BETH DENSON            MRN: [id number]            FIN: [id number]               Age:   18 years     Sex:  Male     :  2000   Associated Diagnoses:   Stomach flu; Mild dehydration; Acne   Author:   Gerardo Yost MD      Impression and Plan   Diagnosis     Stomach flu (ZRQ16-WJ A08.4).     Mild dehydration (ODH99-UK E86.0).     Course:  Worsening.    Plan:  Oral rehydration, Go to ED for IV fluid if the Zofran does not help keep oral fluids down..    Orders     Orders   Charges (Evaluation and Management):  [id number] office outpatient visit 15 minutes (Charge) (Order): Quantity: 1, Stomach flu.     Orders (Selected)   Prescriptions  Prescribed  Zofran ODT 4 mg oral tablet, disintegrating: = 1 tab(s) ( 4 mg ), po, q8 hrs, PRN: as needed for nausea/vomiting, # 15 tab(s), 0 Refill(s), Type: Maintenance, Pharmacy: [facility], 1 tab(s) Oral q8 hrs,PRN:as needed for nausea/vomiting.     Diagnosis     Acne (WCN57-YZ L70.0).     Course:  Worsening.    Orders     Orders (Selected)   Prescriptions  Prescribe  doxycycline hyclate 100 mg oral tablet: = 1 tab(s) ( 100 mg ), PO, Daily, x 30 day(s), # 30 tab(s), 2 Refill(s), Type: Maintenance.        Visit Information   Visit type:  New symptom.    Accompanied by:  Mother.    Source of history:  Self, Mother.    History limitation:  None.       Chief Complaint   2018 10:53 AM CST   Pt here for dizziness and fever        History of Present Illness             The patient presents with vomiting.  The emesis is described as clear.  The severity of the vomiting is severe.  The vomiting is constant.  The vomiting has lasted for 1 day(s).  Episodes of vomiting consist of 6 total episodes, over last 1 day(s).  The context of the vomiting: occurred in association with illness.  There are no modifying factors.  Associated symptoms consist of chills, decreased appetite, dizziness, fatigue, fever, nausea, weakness and denies abdominal pain.  Complaint: cough and  sore throat and myalgias.        Review of Systems   Constitutional:  Fever, Chills, Weakness, Fatigue, Decreased activity.    Eye:  Negative.    Ear/Nose/Mouth/Throat:  Sore throat.    Respiratory:  Cough.    Cardiovascular:  Negative.    Gastrointestinal:  Nausea, Vomiting.    Genitourinary:  Negative.    Hematology/Lymphatics:  Negative.    Endocrine:  Negative.    Immunologic:  Negative.    Musculoskeletal:  Negative.    Integumentary:  Negative.    Neurologic:  Negative.    Psychiatric:  Negative.    All other systems reviewed and negative      Health Status   Allergies:    Allergic Reactions (Selected)  Severity Not Documented  Augmentin (Hives)   Medications:  (Selected)   Prescriptions  Prescribed  FLUoxetine 10 mg oral capsule: = 1 cap(s) ( 10 mg ), PO, Daily, # 30 cap(s), 1 Refill(s), Type: Maintenance, Pharmacy: [facility], 1 cap(s) Oral daily  FLUoxetine 20 mg oral capsule: 1 cap(s) ( 20 mg ), PO, Daily, # 30 cap(s), 1 Refill(s), Type: Maintenance, Pharmacy: [facility], 1 cap(s) Oral daily  traZODone 50 mg oral tablet: = 1 tab(s) ( 50 mg ), PO, hs, # 30 tab(s), 1 Refill(s), Type: Maintenance, Pharmacy: [facility], 1 tab(s) Oral hs  triamcinolone 0.025% topical cream: 1 shantal, TOP, bid, Instructions: apply a thin film  to affected area, PRN: eczema, # 60 gm, 0 Refill(s), Type: Maintenance, Pharmacy: [facility], 1 shantal Topical bid,PRN:eczema,Instr:apply a thin film; to affected area   Problem list:    All Problems  Obesity / SNOMED CT [id number] / Probable      Histories   Past Medical History:    No active or resolved past medical history items have been selected or recorded.   Family History:    No family history items have been selected or recorded.   Procedure history:    No active procedure history items have been selected or recorded.      Physical Examination   Vital Signs   12/4/2018 10:53 AM CST Temperature Tympanic 97.6 DegF  LOW    Peripheral Pulse Rate 83 bpm     Oxygen Saturation 97 %      Measurements from flowsheet : Measurements   12/4/2018 10:53 AM CST Height Measured - Standard 67 in    Weight Measured - Standard 192.8 lb    BSA 2.03 m2    Body Mass Index 30.19 kg/m2    Body Mass Index Percentile 97.51      General:  No acute distress.    HENT:  Normocephalic, Tympanic membranes are clear, Normal hearing, No pharyngeal erythema, No sinus tenderness.    Neck:  Supple, Non-tender, No lymphadenopathy, No thyromegaly.    Respiratory:  Lungs are clear to auscultation, Respirations are non-labored, Breath sounds are equal, Symmetrical chest wall expansion.    Cardiovascular:  Normal rate, Regular rhythm, No murmur, No gallop, Good pulses equal in all extremities, Normal peripheral perfusion, No edema.    Gastrointestinal:  Soft, Non-tender, Non-distended, Normal bowel sounds, No organomegaly, No gaurding or rebound or percussion tenderness.    Integumentary:  Warm, Dry, Pink, mild acne vulgaris on bilateral face and upper back.    Neurologic:  Alert, Oriented.    Psychiatric:  Cooperative, Appropriate mood & affect, Normal judgment.       Review / Management   Results review:  Lab results   12/4/2018 11:40 AM CST Influenza A POC Negative    Influenza B POC Negative    POC Test Comments POC Test Comments   12/4/2018 11:29 AM CST Rapid Strep POC Negative    POC Test Comments POC Test Comments   12/4/2018 11:05 AM CST Glucose Level  mg/dL    POC Test Comments POC Test Comments   .

## 2022-02-15 NOTE — PROGRESS NOTES
Patient:   MARY BETH DENSON            MRN: [id number]            FIN: [id number]               Age:   19 years     Sex:  Male     :  2000   Associated Diagnoses:   Sprain of left thumb; Strain of hip flexor   Author:   Gerardo Yost MD      Impression and Plan   Diagnosis     Sprain of left thumb ([id number]-GS S63.602A).     Course:  Worsening.    Plan:  Continue brace for  1 week(s), Rest, Ice, Compression and Elevation, wrestling excuse for one week.  Follow up if not significantly improved in 1-2 weeks..    Orders     Orders   Charges (Evaluation and Management):  66636 office outpatient visit 15 minutes (Charge) (Order): Quantity: 1, Sprain of left thumb  Strain of hip flexor.     Orders (Selected)   Outpatient Orders  Order  20709 application finger splint static (Charge): Quantity: 1, Sprain of left thumb  XR Fingers Min 2 Views Left (Request): Sprain of left thumb.     Diagnosis     Strain of hip flexor (PBF00-WD S76.011A).     Course:  Progressing as expected.    Plan:  consider MRI to rule out labral tear if not better in 2 weeks.       Visit Information      Date of Service: 2020 09:20 am  Performing Location: [facility]  Encounter#: [id number]      Primary Care Provider (PCP):  Gerardo Yost MD    NPI# [id number]   Visit type:  New symptom.    Accompanied by:  Mother.    Source of history:  Self, Mother.    History limitation:  None.       Chief Complaint   2020 9:22 AM CST    Pt here with injury while wrestling        History of Present Illness             The patient presents with finger pain.  The location of pain is the left and thumb.  The pain is described as aching.  The severity of the pain is moderate.  The timing/course of the pain is constant.  The pain has lasted for 3 day(s).  The context of the pain: occurred during sports.  Exacerbating factors consist of exertion and movement.  Relieving factors consist of analgesics, cold application and immobilization.   Associated symptoms consist of decreased range of motion and joint stiffness.  Prior treatment consists of none.               The patient presents with hip pain, hip injury.  The hip pain is located on the right and hip.  The hip pain is described as aching.  The severity of the hip pain is moderate.  The hip pain is constant.  The hip pain has lasted for 3 day(s).  Radiation of pain none.  The context of the hip pain: occurred sports injury.  Exacerbating factors consist of movement and walking.  Relieving factors consist of analgesics and rest.  Associated symptoms consist of decreased range of motion and gait disturbance.        Review of Systems   Constitutional:  Negative.    Eye:  Negative.    Ear/Nose/Mouth/Throat:  Negative.    Respiratory:  Negative.    Cardiovascular:  Negative.    Gastrointestinal:  Negative.    Genitourinary:  Negative.    Hematology/Lymphatics:  Negative.    Endocrine:  Negative.    Immunologic:  Negative.    Musculoskeletal:  Negative except as documented in history of present illness.    Integumentary:  Negative.    Neurologic:  Negative.    Psychiatric:  Negative.    All other systems reviewed and negative      Health Status   Allergies:    Allergic Reactions (Selected)  Severity Not Documented  Augmentin (Hives)   Medications:  (Selected)   Prescriptions  Prescribed  Advair Diskus 250 mcg-50 mcg inhalation powder: 1 puff(s), Inhale, bid, # 180 EA, 2 Refill(s), Type: Maintenance, Pharmacy: [facility], 1 puff(s) Inhale bid  ProAir HFA 90 mcg/inh inhalation aerosol: 2 puff(s), inh, qid, # 1 EA, 5 Refill(s), Type: Maintenance, Pharmacy: [facility], 2 puff(s) Inhale qid  Wellbutrin  mg/24 hours oral tablet, extended release: = 1 tab(s) ( 300 mg ), Oral, q 24 hrs, # 30 tab(s), 3 Refill(s), Type: Maintenance, Pharmacy: [facility], 1 tab(s) Oral q 24 hrs   Problem list:    All Problems  Moderate persistent asthma / SNOMED CT 1255336601 / Confirmed  Obesity /  SNOMED CT 1662109896 / Probable  Severe major depressive disorder / SNOMED CT 9486372695 / Confirmed  Tobacco user / SNOMED CT 844798830 / Confirmed      Histories   Past Medical History:    No active or resolved past medical history items have been selected or recorded.   Family History:    No family history items have been selected or recorded.   Procedure history:    No active procedure history items have been selected or recorded.   Social History:             No active social history items have been recorded.      Physical Examination   Vital Signs   1/27/2020 9:22 AM CST Peripheral Pulse Rate 75 bpm    Systolic Blood Pressure 122 mmHg    Diastolic Blood Pressure 62 mmHg    Mean Arterial Pressure 82 mmHg    Oxygen Saturation 99 %      Measurements from flowsheet : Measurements   1/27/2020 9:22 AM CST Height Measured - Standard 68 in    Weight Measured - Standard 185 lb    BSA 2 m2    Body Mass Index 28.13 kg/m2    Body Mass Index Percentile 94.18      General:  Alert and oriented X 3, No acute distress, Warm, Pink, Intact.         Appearance: Within normal limits, Well nourished, Calm.         Hydration: Within normal limits.         Psych: Within normal limits, Appropriate mood and affect, Cooperative, Normal judgment.    Musculoskeletal:          Mobility/ gait: walks with slight limp due to right hip pain.         Upper extremity exam: Fingers ( Left, First finger, Proximal interphalangeal joint, Not displaced, Deformity negative, No erythema, No ecchymosis, No mass, No nodule, Swelling, Tenderness, No wound, No crepitus, Pain, Strength  5 /5, Normal , Range of motion ( Diminished ) ).         Lower extremity exam: Hip ( Right, No deformity, No erythema, No ecchymosis, No mass, No nodule, No swelling, Tenderness, No wound, No crepitus, No pain, No numbness, No tingling, Strength  5 /5, Range of motion ( Diminished ) ).       Review / Management   Radiology results   X-ray, 2 views left thumb , Reveals no  acute disease process, Radiograph(s) result as interpreted by ordering provider reviewed with patient.  Radiologist will also interpret the radiograph(s) and patient will be informed if result is modified when both interpretations are compared.

## 2022-02-15 NOTE — NURSING NOTE
Comprehensive Intake Entered On:  1/29/2020 4:19 PM CST    Performed On:  1/29/2020 4:15 PM CST by Alma Valera CMA               Summary   Chief Complaint :   Pt here for rash   Menstrual Status :   N/A   Weight Measured :   185 lb(Converted to: 185 lb 0 oz, 83.91 kg)    Height Measured :   68 in(Converted to: 5 ft 8 in, 172.72 cm)    Body Mass Index :   28.13 kg/m2   Body Surface Area :   2 m2   Peripheral Pulse Rate :   80 bpm   Temperature Tympanic :   98.8 DegF(Converted to: 37.1 DegC)    Oxygen Saturation :   98 %   Alma Valera CMA - 1/29/2020 4:15 PM CST   Health Status   Allergies Verified? :   Yes   Medication History Verified? :   Yes   Medical History Verified? :   Yes   Pre-Visit Planning Status :   Completed   Tobacco Use? :   Never smoker   Alma Valera CMA - 1/29/2020 4:15 PM CST   Consents   Consent for Immunization Exchange :   Consent Granted   Consent for Immunizations to Providers :   Consent Granted   Alma Valera CMA - 1/29/2020 4:15 PM CST   Meds / Allergies   (As Of: 1/29/2020 4:19:22 PM CST)   Allergies (Active)   Augmentin  Estimated Onset Date:   Unspecified ; Reactions:   Hives ; Created By:   Marietta Nova CMA; Reaction Status:   Active ; Category:   Drug ; Substance:   Augmentin ; Type:   Allergy ; Updated By:   Marietta Nova CMA; Reviewed Date:   1/29/2020 4:16 PM CST        Medication List   (As Of: 1/29/2020 4:19:22 PM CST)   Prescription/Discharge Order    albuterol  :   albuterol ; Status:   Prescribed ; Ordered As Mnemonic:   ProAir HFA 90 mcg/inh inhalation aerosol ; Simple Display Line:   2 puff(s), inh, qid, 1 EA, 5 Refill(s) ; Ordering Provider:   Gerardo Yost MD; Catalog Code:   albuterol ; Order Dt/Tm:   12/11/2019 3:22:02 PM CST          buPROPion  :   buPROPion ; Status:   Prescribed ; Ordered As Mnemonic:   Wellbutrin  mg/24 hours oral tablet, extended release ; Simple Display Line:   300 mg, 1 tab(s), Oral, q 24 hrs, 30 tab(s), 3 Refill(s)  ; Ordering Provider:   Gosia Tracey MD; Catalog Code:   buPROPion ; Order Dt/Tm:   1/10/2020 2:01:39 PM CST          fluticasone-salmeterol  :   fluticasone-salmeterol ; Status:   Prescribed ; Ordered As Mnemonic:   Advair Diskus 250 mcg-50 mcg inhalation powder ; Simple Display Line:   1 puff(s), Inhale, bid, 180 EA, 2 Refill(s) ; Ordering Provider:   Gosia Tracey MD; Catalog Code:   fluticasone-salmeterol ; Order Dt/Tm:   1/10/2020 2:39:06 PM CST   Peripheral edema

## 2022-02-15 NOTE — LETTER
(Inserted Image. Unable to display)     March 09, 2020      ITANTCINTHYA JARETT   [place], [place] [id number]          Dear MARY BETH,      Thank you for selecting [facility] (previously [facility]) for your healthcare needs.      Our records indicate you are due for the following services:     Follow-up office visit / Medication Check.    Asthma Follow-up Office Visit ~ Please bring in your inhalers/asthma medications that you are currently using to your visit.       To schedule an appointment or if you have further questions, please contact your primary clinic:   [facility]       [phone]   Critical access hospital       [phone]              [facility]     [phone]      Powered by [employer]    Sincerely,    Gosia Tracey MD

## 2022-02-15 NOTE — NURSING NOTE
Comprehensive Intake Entered On:  2/22/2019 2:03 PM CST    Performed On:  2/22/2019 2:00 PM CST by Marietta Nova CMA               Summary   Chief Complaint :   Patient presents for depression medication check.   Menstrual Status :   N/A   Weight Measured - Metric :   90.4 kg(Converted to: 199 lb 5 oz, 199.298 lb)    Height Measured - Metric :   171.45 cm(Converted to: 5 ft 7 in, 5.62 ft, 1.71 m)    Body Mass Index - Metric :   30.75 kg/m2   BSA - Metric :   2.07 m2   Systolic Blood Pressure :   110 mmHg   Diastolic Blood Pressure :   70 mmHg   Mean Arterial Pressure :   83 mmHg   Peripheral Pulse Rate :   71 bpm   BP Site :   Right arm   BP Method :   Manual   HR Method :   Electronic   Temperature Tympanic :   97.8 DegF(Converted to: 36.6 DegC)  (LOW)    Oxygen Saturation :   98 %   Marietta Nova CMA - 2/22/2019 2:00 PM CST   Health Status   Allergies Verified? :   Yes   Medication History Verified? :   Yes   Pre-Visit Planning Status :   Completed   Tobacco Use? :   Current some day smoker   Marietta Nova CMA - 2/22/2019 2:00 PM CST   Meds / Allergies   (As Of: 2/22/2019 2:03:19 PM CST)   Allergies (Active)   Augmentin  Estimated Onset Date:   Unspecified ; Reactions:   Hives ; Created By:   Marietta Nova CMA; Reaction Status:   Active ; Category:   Drug ; Substance:   Augmentin ; Type:   Allergy ; Updated By:   Marietta Nova CMA; Reviewed Date:   2/22/2019 2:02 PM CST        Medication List   (As Of: 2/22/2019 2:03:19 PM CST)   Prescription/Discharge Order    doxycycline  :   doxycycline ; Status:   Prescribed ; Ordered As Mnemonic:   doxycycline hyclate 100 mg oral tablet ; Simple Display Line:   100 mg, 1 tab(s), PO, Daily, for 30 day(s), 30 tab(s), 2 Refill(s) ; Ordering Provider:   Gerardo Yost MD; Catalog Code:   doxycycline ; Order Dt/Tm:   12/4/2018 11:45:10 AM          FLUoxetine  :   FLUoxetine ; Status:   Prescribed ; Ordered As Mnemonic:   FLUoxetine 10 mg oral capsule ; Simple  Display Line:   10 mg, 1 cap(s), PO, Daily, 30 cap(s), 1 Refill(s) ; Ordering Provider:   Gosia Tracey MD; Catalog Code:   FLUoxetine ; Order Dt/Tm:   9/28/2018 2:28:34 PM          FLUoxetine  :   FLUoxetine ; Status:   Prescribed ; Ordered As Mnemonic:   FLUoxetine 20 mg oral capsule ; Simple Display Line:   20 mg, 1 cap(s), PO, Daily, 30 cap(s), 1 Refill(s) ; Ordering Provider:   Gosia Tracey MD; Catalog Code:   FLUoxetine ; Order Dt/Tm:   8/2/2018 4:38:22 PM          ondansetron  :   ondansetron ; Status:   Prescribed ; Ordered As Mnemonic:   Zofran ODT 4 mg oral tablet, disintegrating ; Simple Display Line:   4 mg, 1 tab(s), po, q8 hrs, PRN: as needed for nausea/vomiting, 15 tab(s), 0 Refill(s) ; Ordering Provider:   Gerardo Yost MD; Catalog Code:   ondansetron ; Order Dt/Tm:   12/4/2018 11:44:46 AM          traZODone  :   traZODone ; Status:   Prescribed ; Ordered As Mnemonic:   traZODone 50 mg oral tablet ; Simple Display Line:   50 mg, 1 tab(s), PO, hs, 30 tab(s), 1 Refill(s) ; Ordering Provider:   Gosia Tracey MD; Catalog Code:   traZODone ; Order Dt/Tm:   9/28/2018 2:29:49 PM          triamcinolone topical  :   triamcinolone topical ; Status:   Prescribed ; Ordered As Mnemonic:   triamcinolone 0.025% topical cream ; Simple Display Line:   1 shantal, TOP, bid, apply a thin film  to affected area, PRN: eczema, 60 gm, 0 Refill(s) ; Ordering Provider:   Gosia Tracey MD; Catalog Code:   triamcinolone topical ; Order Dt/Tm:   8/2/2018 4:38:42 PM

## 2022-02-15 NOTE — NURSING NOTE
Comprehensive Intake Entered On:  3/19/2019 3:49 PM CDT    Performed On:  3/19/2019 3:41 PM CDT by Venecia Caldera LPN               Summary   Chief Complaint :   medication check, labs, follow up hospital.      Menstrual Status :   N/A   Weight Measured - Metric :   93 kg(Converted to: 205 lb 0 oz, 205.030 lb)    Height Measured - Metric :   170 cm(Converted to: 5 ft 7 in, 5.58 ft, 1.70 m)    Body Mass Index - Metric :   32.18 kg/m2   BSA - Metric :   2.1 m2   Systolic Blood Pressure :   118 mmHg   Diastolic Blood Pressure :   64 mmHg   Mean Arterial Pressure :   82 mmHg   BP Site :   Right arm   Pulse Site :   Radial artery   BP Method :   Manual   HR Method :   Manual   Temperature Tympanic :   97.6 DegF(Converted to: 36.4 DegC)  (LOW)    Venecia Caldera LPN - 3/19/2019 3:41 PM CDT   Health Status   Allergies Verified? :   Yes   Medication History Verified? :   Yes   Pre-Visit Planning Status :   Completed   Venecia Caldera LPN - 3/19/2019 3:41 PM CDT   Consents   Consent for Immunization Exchange :   Consent Granted   Consent for Immunizations to Providers :   Consent Granted   Venecia Caldera LPN - 3/19/2019 3:41 PM CDT   Meds / Allergies   (As Of: 3/19/2019 3:49:28 PM CDT)   Allergies (Active)   Augmentin  Estimated Onset Date:   Unspecified ; Reactions:   Hives ; Created By:   Marietta Nova CMA; Reaction Status:   Active ; Category:   Drug ; Substance:   Augmentin ; Type:   Allergy ; Updated By:   Marietta Nova CMA; Reviewed Date:   3/19/2019 3:42 PM CDT        Medication List   (As Of: 3/19/2019 3:49:28 PM CDT)   Prescription/Discharge Order    sertraline  :   sertraline ; Status:   Completed ; Ordered As Mnemonic:   Zoloft 25 mg oral tablet ; Simple Display Line:   25 mg, 1 tab(s), PO, Daily, 30 tab(s), 0 Refill(s) ; Ordering Provider:   Gosia Tracey MD; Catalog Code:   sertraline ; Order Dt/Tm:   2/22/2019 2:21:21 PM            Home Meds    venlafaxine  :   venlafaxine ; Status:    Documented ; Ordered As Mnemonic:   venlafaxine 37.5 mg oral capsule, extended release ; Simple Display Line:   37.5 mg, 1 cap(s), PO, Daily, 30 cap(s), 0 Refill(s) ; Catalog Code:   venlafaxine ; Order Dt/Tm:   3/19/2019 3:47:55 PM          mirtazapine  :   mirtazapine ; Status:   Documented ; Ordered As Mnemonic:   mirtazapine 15 mg oral tablet ; Simple Display Line:   15 mg, 1 tab(s), PO, Once a day (at bedtime), 30 tab(s), 0 Refill(s) ; Catalog Code:   mirtazapine ; Order Dt/Tm:   3/19/2019 3:48:26 PM

## 2022-02-15 NOTE — LETTER
(Inserted Image. Unable to display)     September 16, 2019      MARY BETH DENSON   [place], [place] [id number]          Dear MARY BETH,      Thank you for selecting [facility] ([facility]viously [facility]) for your healthcare needs.      Our records indicate you are due for the following services:     Follow-up office visit / Medication Check.      To schedule an appointment or if you have further questions, please contact your primary clinic:   [facility]       [phone]   [facility]       [phone]              [facility]     [phone]      Powered by [employer]    Sincerely,    Gosia Tracey MD

## 2022-02-15 NOTE — NURSING NOTE
Generalized Anxiety Disorder Screening Entered On:  8/16/2019 9:08 AM CDT    Performed On:  8/13/2019 9:00 AM CDT by Gladys Romero               Generalized Anxiety Disorder Screening   WILLIE Nervous, Anxious On Edge :   Several days   WILLIE Control Worrying B :   Several days   WILLIE Worrying Too Much :   More than half the days   WILLIE Restless :   Nearly every day   WILLIE Easily Annoyed/Irritable :   Several days   WILLIE Afraid :   Several days   WILLIE Trouble Relaxing :   Nearly every day   WILLIE Total Screening Score :   12    WILLIE Difficulty with Work, Home, Others :   Somewhat difficult   Gladys Romero - 8/16/2019 9:00 AM CDT

## 2022-02-15 NOTE — NURSING NOTE
Comprehensive Intake Entered On:  10/4/2019 2:28 PM CDT    Performed On:  10/4/2019 2:23 PM CDT by Melissa Lewis LPN               Summary   Chief Complaint :   2 week follow up. feels it is still the same. could still be better.    Menstrual Status :   N/A   Weight Measured - Metric :   90.6 kg(Converted to: 199 lb 12 oz, 199.739 lb)    Height Measured - Metric :   171 cm(Converted to: 5 ft 7 in, 5.61 ft, 1.71 m)    Body Mass Index - Metric :   30.98 kg/m2   BSA - Metric :   2.07 m2   Systolic Blood Pressure :   110 mmHg   Diastolic Blood Pressure :   70 mmHg   Mean Arterial Pressure :   83 mmHg   Peripheral Pulse Rate :   101 bpm (HI)    BP Site :   Right arm   BP Method :   Manual   HR Method :   Electronic   Oxygen Saturation :   96 %   Melissa Lewis LPN - 10/4/2019 2:23 PM CDT   Health Status   Allergies Verified? :   Yes   Medication History Verified? :   Yes   Medical History Verified? :   Yes   Pre-Visit Planning Status :   Completed   Tobacco Use? :   Never smoker   Melissa Lewis LPN - 10/4/2019 2:23 PM CDT   Demographics   Last Name :   Estevan   Address :   [address]   First Name :   Lucina   Responsible Party Date of Birth () :   2000 CDT   City :   [place]   State :   [place]   Zip Code :   [id number]   Contact Relationship to Patient (other) :   Patient   Joshua Melissa CALLEJAS - 10/4/2019 2:23 PM CDT   Consents   Consent for Immunization Exchange :   Consent Granted   Consent for Immunizations to Providers :   Consent Granted   Melissa Lewis LPN - 10/4/2019 2:23 PM CDT   Problems   (As Of: 10/4/2019 2:28:33 PM CDT)   Problems(Active)    Obesity (SNOMED CT  :4840644229 )  Name of Problem:   Obesity ; Recorder:   SYSTEM; Confirmation:   Probable ; Classification:   Medical ; Code:   [id number] ; Last Updated:   2018 2:09 PM CDT ; Life Cycle Date:   2018 ; Life Cycle Status:   Active ; Vocabulary:   SNOMED CT        Severe major depressive disorder (SNOMED CT  :9227315207 )  Name of  Problem:   Severe major depressive disorder ; Recorder:   Gosia Tracey MD; Confirmation:   Confirmed ; Classification:   Medical ; Code:   [id number] ; Contributor System:   Power[employer] ; Last Updated:   3/19/2019 4:11 PM CDT ; Life Cycle Status:   Active ; Responsible Provider:   Gosia Tracey MD; Vocabulary:   SNOMED CT          Meds / Allergies   (As Of: 10/4/2019 2:28:34 PM CDT)   Allergies (Active)   Augmentin  Estimated Onset Date:   Unspecified ; Reactions:   Hives ; Created By:   Marietta Nova CMA; Reaction Status:   Active ; Category:   Drug ; Substance:   Augmentin ; Type:   Allergy ; Updated By:   Marietta Nova CMA; Reviewed Date:   10/4/2019 2:26 PM CDT        Medication List   (As Of: 10/4/2019 2:28:34 PM CDT)   Prescription/Discharge Order    betamethasone topical  :   betamethasone topical ; Status:   Prescribed ; Ordered As Mnemonic:   betamethasone valerate 0.1% topical cream ; Simple Display Line:   1 shantal, TOP, BID, 45 g, 1 Refill(s) ; Ordering Provider:   Gerardo Yost MD; Catalog Code:   betamethasone topical ; Order Dt/Tm:   10/1/2019 10:11:14 AM CDT          buPROPion  :   buPROPion ; Status:   Prescribed ; Ordered As Mnemonic:   Wellbutrin  mg/24 hours oral tablet, extended release ; Simple Display Line:   150 mg, 1 tab(s), Oral, q 24 hrs, 30 tab(s), 1 Refill(s) ; Ordering Provider:   Gosia Tracey MD; Catalog Code:   buPROPion ; Order Dt/Tm:   8/13/2019 7:08:21 PM CDT          hydrOXYzine  :   hydrOXYzine ; Status:   Prescribed ; Ordered As Mnemonic:   hydrOXYzine hydrochloride 25 mg oral tablet ; Simple Display Line:   25 mg, 1 tab(s), Oral, q4-6 hrs, PRN: as needed for itching, 25 tab(s), 1 Refill(s) ; Ordering Provider:   Gerardo Yost MD; Catalog Code:   hydrOXYzine ; Order Dt/Tm:   10/1/2019 10:10:25 AM CDT

## 2022-02-15 NOTE — LETTER
(Inserted Image. Unable to display)     November 04, 2019      MARY BETH DENSON   [place], [place] [id number]          Dear MARY BETH,      Thank you for selecting [facility] (previously [facility]) for your healthcare needs.      Our records indicate you are due for the following services:     Follow-up office visit.      To schedule an appointment or if you have further questions, please contact your primary clinic:   [facility]       [phone]   [facility]       [phone]              [facility]     [phone]      Powered by [employer]    Sincerely,    Gosia Tracey MD

## 2022-02-15 NOTE — NURSING NOTE
Comprehensive Intake Entered On:  10/1/2019 9:50 AM CDT    Performed On:  10/1/2019 9:48 AM CDT by Alma Valera CMA               Summary   Chief Complaint :   Pt here for rash/hives   Menstrual Status :   N/A   Weight Measured :   205 lb(Converted to: 205 lb 0 oz, 92.99 kg)    Height Measured :   67 in(Converted to: 5 ft 7 in, 170.18 cm)    Body Mass Index :   32.1 kg/m2   Body Surface Area :   2.09 m2   Peripheral Pulse Rate :   58 bpm   Temperature Tympanic :   97.7 DegF(Converted to: 36.5 DegC)  (LOW)    Oxygen Saturation :   99 %   Alma Valera CMA - 10/1/2019 9:48 AM CDT   Health Status   Allergies Verified? :   Yes   Medication History Verified? :   Yes   Medical History Verified? :   Yes   Pre-Visit Planning Status :   Completed   Tobacco Use? :   Never smoker   Alma Valera CMA - 10/1/2019 9:48 AM CDT   Consents   Consent for Immunization Exchange :   Consent Granted   Consent for Immunizations to Providers :   Consent Granted   Alma Valera CMA - 10/1/2019 9:48 AM CDT   Meds / Allergies   (As Of: 10/1/2019 9:50:47 AM CDT)   Allergies (Active)   Augmentin  Estimated Onset Date:   Unspecified ; Reactions:   Hives ; Created By:   Marietta Nova CMA; Reaction Status:   Active ; Category:   Drug ; Substance:   Augmentin ; Type:   Allergy ; Updated By:   Marietta Nova CMA; Reviewed Date:   10/1/2019 9:49 AM CDT        Medication List   (As Of: 10/1/2019 9:50:47 AM CDT)   Prescription/Discharge Order    buPROPion  :   buPROPion ; Status:   Prescribed ; Ordered As Mnemonic:   Wellbutrin  mg/24 hours oral tablet, extended release ; Simple Display Line:   150 mg, 1 tab(s), Oral, q 24 hrs, 30 tab(s), 1 Refill(s) ; Ordering Provider:   Gosia Tracey MD; Catalog Code:   buPROPion ; Order Dt/Tm:   8/13/2019 7:08:21 PM          hydrOXYzine  :   hydrOXYzine ; Status:   Prescribed ; Ordered As Mnemonic:   hydrOXYzine hydrochloride 25 mg oral tablet ; Simple Display Line:   See Instructions, 1 tab  by mouth PRN for panic attack.  1-2 tabs by mouth at bedtime PRN.  Do not exceed more than 4 tabs in a 24 hr peroid., PRN: for anxiety, 40 tab(s), 1 Refill(s) ; Ordering Provider:   Gosia Tracey MD; Catalog Code:   hydrOXYzine ; Order Dt/Tm:   8/13/2019 7:08:35 PM

## 2022-02-15 NOTE — PROGRESS NOTES
Patient:   MARY BETH DENSON            MRN: [id number]            FIN: [id number]               Age:   19 years     Sex:  Male     :  2000   Associated Diagnoses:   Sports physical   Author:   Gerardo Yost MD      Impression and Plan   Diagnosis     Sports physical ([id number] Z02.5).     Plan:  No restrictions or special recommendations for school athletic participation.    Summary:  Patient states that depression is now under excellant control .    Orders     Orders   Charges (Evaluation and Management):  04656 periodic preventive med est patient 12-17yrs (Charge) (Order): Quantity: 1, Sports physical.        Visit Information      Date of Service: 2019 03:48 pm  Performing Location: [facility]  Encounter#: [id number]      Primary Care Provider (PCP):  Gerardo Yost MD    NPI# [id number]      Referring Provider:  Gerardo Yost MD, NPI# [id number]   Visit type:  Annual exam.    Accompanied by:  No one.    Source of history:  Self.    History limitation:  None.       Chief Complaint   Chief complaint discussed and confirmed correct.     2019 4:01 PM CST   Sports Exam        History of Present Illness   See History form filled out by parent (scanned to EMR)      Review of Systems   Constitutional:  Negative.    Eye:  Negative.    Ear/Nose/Mouth/Throat:  Negative.    Respiratory:  Negative.    Cardiovascular:  Negative.    Gastrointestinal:  Negative.    Genitourinary:  Negative.    Hematology/Lymphatics:  Negative.    Endocrine:  Negative.    Immunologic:  Negative.    Musculoskeletal:  Negative.    Integumentary:  Negative.    Neurologic:  Negative.    Psychiatric:  Negative.           All other systems reviewed and negative      Health Status   Allergies:    Allergic Reactions (Selected)  Severity Not Documented  Augmentin (Hives)   Medications:  (Selected)   Prescriptions  Prescribed  ProAir HFA 90 mcg/inh inhalation aerosol: 2 puff(s), inh, qid, # 1 EA, 5 Refill(s), Type:  Maintenance, Pharmacy: [facility], 2 puff(s) Inhale qid  Wellbutrin  mg/24 hours oral tablet, extended release: = 1 tab(s) ( 300 mg ), Oral, q 24 hrs, # 30 tab(s), 1 Refill(s), Type: Maintenance, Pharmacy: [facility], 1 tab(s) Oral q 24 hrs  hydrOXYzine hydrochloride 25 mg oral tablet: = 1 tab(s) ( 25 mg ), Oral, q4-6 hrs, PRN: as needed for itching, # 25 tab(s), 1 Refill(s), Type: Maintenance, Pharmacy: [facility], 1 tab(s) Oral q4-6 hrs,PRN:as needed for itching,    Medications          *denotes recorded medication          ProAir HFA 90 mcg/inh inhalation aerosol: 2 puff(s), inh, qid, 1 EA, 5 Refill(s).          Wellbutrin  mg/24 hours oral tablet, extended release: 300 mg, 1 tab(s), Oral, q 24 hrs, 30 tab(s), 1 Refill(s).          hydrOXYzine hydrochloride 25 mg oral tablet: 25 mg, 1 tab(s), Oral, q4-6 hrs, PRN: as needed for itching, 25 tab(s), 1 Refill(s).       Problem list:    All Problems  Obesity / SNOMED CT 5336821997 / Probable  Severe major depressive disorder / SNOMED CT 8574100447 / Confirmed      Histories   Past Medical History:    No active or resolved past medical history items have been selected or recorded.   Family History:    No family history items have been selected or recorded.   Procedure history:    No active procedure history items have been selected or recorded.   Social History:             No active social history items have been recorded.      Physical Examination   Vital signs reviewed  and within acceptable limits    Vital Signs   11/14/2019 4:01 PM CST Temperature Tympanic 98.8 DegF    Peripheral Pulse Rate 70 bpm    Pulse Site Radial artery    HR Method Electronic    Systolic Blood Pressure 104 mmHg    Diastolic Blood Pressure 70 mmHg    Mean Arterial Pressure 81 mmHg    BP Site Left arm    BP Method Manual    Oxygen Saturation 97 %      Measurements from flowsheet : Measurements   11/14/2019 4:01 PM CST Height Measured - Standard 67.5 in     Weight Measured - Standard 192 lb    BSA 2.03 m2    Body Mass Index 29.62 kg/m2    Body Mass Index Percentile 96.54      General:  Alert and oriented, No acute distress.    Eye:  Pupils are equal, round and reactive to light, Extraocular movements are intact, Normal conjunctiva.    HENT:  Normocephalic, Tympanic membranes are clear, Normal hearing, Oral mucosa is moist, No pharyngeal erythema.    Neck:  Supple, Non-tender, No carotid bruit, No jugular venous distention, No lymphadenopathy, No thyromegaly.    Respiratory:  Lungs are clear to auscultation, Respirations are non-labored, Breath sounds are equal, Symmetrical chest wall expansion, No chest wall tenderness.    Cardiovascular:  Normal rate, Regular rhythm, No murmur, No gallop, Good pulses equal in all extremities, Normal peripheral perfusion, No edema.    Gastrointestinal:  Soft, Non-tender, Non-distended, Normal bowel sounds, No organomegaly.    Lymphatics:  No lymphadenopathy neck, axilla, groin.    Musculoskeletal:  Normal range of motion, Normal strength, No tenderness, No swelling, No deformity, Normal gait.    Integumentary:  Warm, Dry, Pink.    Neurologic:  Normal sensory, Normal motor function, No focal deficits, Cranial Nerves II-XII are grossly intact, Normal deep tendon reflexes.    Cognition and Speech:  Oriented, Speech clear and coherent, Functional cognition intact.    Psychiatric:  Cooperative, Appropriate mood & affect, Normal judgment.

## 2022-02-15 NOTE — NURSING NOTE
Comprehensive Intake Entered On:  1/10/2020 1:49 PM CST    Performed On:  1/10/2020 1:48 PM CST by Ty Solorio               Summary   Chief Complaint :   Pt here today for med check.    Menstrual Status :   N/A   Weight Measured - Metric :   84.2 kg(Converted to: 185 lb 10 oz, 185.629 lb)    Height Measured - Metric :   171.5 cm(Converted to: 5 ft 8 in, 5.63 ft, 1.72 m)    Body Mass Index - Metric :   28.63 kg/m2   BSA - Metric :   2 m2   Systolic Blood Pressure :   108 mmHg   Diastolic Blood Pressure :   82 mmHg   Mean Arterial Pressure :   91 mmHg   Peripheral Pulse Rate :   88 bpm   BP Site :   Right arm   BP Method :   Manual   HR Method :   Manual   Ty Solorio - 1/10/2020 1:48 PM CST   Health Status   Allergies Verified? :   Yes   Medication History Verified? :   Yes   Medical History Verified? :   Yes   Pre-Visit Planning Status :   Completed   Ty Solorio - 1/10/2020 1:48 PM CST   Consents   Consent for Immunization Exchange :   Consent Granted   Consent for Immunizations to Providers :   Consent Granted   Ty Solorio - 1/10/2020 1:48 PM CST   Meds / Allergies   (As Of: 1/10/2020 1:49:20 PM CST)   Allergies (Active)   Augmentin  Estimated Onset Date:   Unspecified ; Reactions:   Hives ; Created By:   Marietta Nova CMA; Reaction Status:   Active ; Category:   Drug ; Substance:   Augmentin ; Type:   Allergy ; Updated By:   Marietta Nova CMA; Reviewed Date:   1/10/2020 1:49 PM CST        Medication List   (As Of: 1/10/2020 1:49:20 PM CST)   Prescription/Discharge Order    albuterol  :   albuterol ; Status:   Prescribed ; Ordered As Mnemonic:   ProAir HFA 90 mcg/inh inhalation aerosol ; Simple Display Line:   2 puff(s), inh, qid, 1 EA, 5 Refill(s) ; Ordering Provider:   Gerardo Yost MD; Catalog Code:   albuterol ; Order Dt/Tm:   12/11/2019 3:22:02 PM CST          buPROPion  :   buPROPion ; Status:   Prescribed ; Ordered As Mnemonic:    Wellbutrin  mg/24 hours oral tablet, extended release ; Simple Display Line:   300 mg, 1 tab(s), Oral, q 24 hrs, 30 tab(s), 1 Refill(s) ; Ordering Provider:   Gosia Tracey MD; Catalog Code:   buPROPion ; Order Dt/Tm:   10/4/2019 2:49:02 PM CDT          hydrOXYzine  :   hydrOXYzine ; Status:   Prescribed ; Ordered As Mnemonic:   hydrOXYzine hydrochloride 25 mg oral tablet ; Simple Display Line:   25 mg, 1 tab(s), Oral, q4-6 hrs, PRN: as needed for itching, 25 tab(s), 1 Refill(s) ; Ordering Provider:   Gerardo Yost MD; Catalog Code:   hydrOXYzine ; Order Dt/Tm:   10/1/2019 10:10:25 AM CDT

## 2022-02-15 NOTE — PROGRESS NOTES
Patient:   MARY BETH DENSON            MRN: [id number]            FIN: [id number]               Age:   17 years     Sex:  Male     :  2000   Associated Diagnoses:   Major depression   Author:   Gosia Tracey MD      Chief Complaint   2018 11:07 AM CDT   New patient presents for depression and anger concerns.      History of Present Illness   Chief complaint and symptoms as noted above and confirmed with patient.  Here today with mom for anger and depression concerns.  Over the last several years has had increasing troubles with a short fuse.  Will blow up at his sister s talking.  Is intensified over the last few months with threats of suicide.  Mom gives the example of a fight with her girlfriend will trigger threats of suicide.  He denies that he has any specific plan.  Mom and dad split up when he was young.  Dad has not been in the picture for the last several years.  Struggles with methamphetamine addiction.  Mom states she has history of anxiety and depression.  [place] complains about difficulty staying on task.  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  Says he will be in the middle of cleaning one machine and then stop what he is doing to go do something else without knowing why.  Denies drugs or alcohol.  Gets A s and B s in school.  Not sure what he wants to do when he grows up.  Currently lives with mom sister Ashley Mckee and his older sister and her 3 children.  Older sister and her 3 children just recently moved in with the family.  Sleep: Not going to bed until 3 AM.  Has trouble falling asleep.  Typically is on his phone or playing video games.         Review of Systems   All other systems are negative      Health Status   Allergies:    Allergic Reactions (Selected)  Severity Not Documented  Augmentin (Hives)   Medications:  (Selected)      Problem list:    No problem items selected or recorded.      Histories   Past Medical History:    No active or resolved  past medical history items have been selected or recorded.   Family History:    No family history items have been selected or recorded.   Procedure history:    No active procedure history items have been selected or recorded.   Social History:             No active social history items have been recorded.      Physical Examination   Vital Signs   6/29/2018 11:07 AM CDT Temperature Tympanic 97.8 DegF  LOW    Peripheral Pulse Rate 69 bpm    HR Method Electronic    Systolic Blood Pressure 100 mmHg    Diastolic Blood Pressure 64 mmHg    Mean Arterial Pressure 76 mmHg    BP Site Right arm    BP Method Manual      Measurements from flowsheet : Measurements   6/29/2018 11:07 AM CDT Height Measured - Metric 171.45 cm    Weight Measured - Metric 83.4 kg    BSA - Metric 1.99 m2    Body Mass Index - Metric 28.37 kg/m2    Body Mass Index Percentile 96.14      Vital signs as noted above   General:  Alert and oriented.    Psychiatric:  Cooperative, Appropriate mood & affect, Normal judgment, Non-suicidal.       Review / Management   Robinson 7: 9, very difficult  PHQ A: 12/27..         Impression and Plan   Diagnosis     Major depression ([id number] F32.9).     Plan:  We will start fluoxetine 10 mg once daily.  Risks and benefits reviewed including black box warning.  Recommended no firearms in the home, lock box for all medication, mother to be in control of the fluoxetine and ensure that he is taking it daily.  Encouraged to seek out a therapist.  List provided.  Recommend slowly backing off on the time he goes to bed.  Encouraged a trial of 3 mg of melatonin.  Return to clinic in 2 weeks for follow-up, sooner if problems.  Spent 45 minutes with family greater than 50% was in counseling.  .    Orders     Orders (Selected)   Prescriptions  Prescribed  FLUoxetine 10 mg oral capsule: 1 cap(s) ( 10 mg ), PO, Daily, # 30 cap(s), 0 Refill(s), Type: Maintenance, Pharmacy: [facility], 1 cap(s) Oral daily.

## 2022-02-15 NOTE — NURSING NOTE
Depression Screening Entered On:  2/25/2019 11:30 AM CST    Performed On:  2/22/2019 11:29 AM CST by Norma Caballero               Depression Screening   Feeling Down, Depressed, Hopeless :   Nearly every day   Little Interest - Pleasure in Activities :   More than half the days   Initial Depression Screen Score :   5    Trouble Falling or Staying Asleep :   More than half the days   Feeling Tired or Little Energy :   More than half the days   Poor Appetite or Overeating :   Several days   Feeling Bad About Yourself :   More than half the days   Trouble Concentrating :   More than half the days   Moving or Speaking Slowly :   Several days   Thoughts Better Off Dead or Hurting Self :   Nearly every day   Detailed Depression Screen Score :   13    Total Depression Screen Score :   18    Norma Caballero - 2/25/2019 11:29 AM CST

## 2022-02-15 NOTE — NURSING NOTE
Comprehensive Intake Entered On:  8/13/2019 6:42 PM CDT    Performed On:  8/13/2019 6:37 PM CDT by Melissa Lewis LPN               Summary   Chief Complaint :   med refill. could be better. had a few times he got dizzy in the past 3 weeks   Joshua CALLEJAS Melissa GIVENS - 8/13/2019 6:47 PM CDT     Menstrual Status :   N/A   Weight Measured - Metric :   93.2 kg(Converted to: 205 lb 8 oz, 205.471 lb)    Height Measured - Metric :   171.5 cm(Converted to: 5 ft 8 in, 5.63 ft, 1.72 m)    Body Mass Index - Metric :   31.69 kg/m2   BSA - Metric :   2.11 m2   Systolic Blood Pressure :   116 mmHg   Diastolic Blood Pressure :   80 mmHg   Mean Arterial Pressure :   92 mmHg   Peripheral Pulse Rate :   114 bpm (HI)    BP Site :   Right arm   BP Method :   Manual   HR Method :   Electronic   Oxygen Saturation :   93 % (LOW)    Melissa Lewis LPN - 8/13/2019 6:37 PM CDT   Health Status   Allergies Verified? :   Yes   Medication History Verified? :   Yes   Medical History Verified? :   Yes   Pre-Visit Planning Status :   Completed   Tobacco Use? :   Current every day smoker   Tobacco Cessation Review :   Not ready to quit   Melissa Lewis LPN - 8/13/2019 6:37 PM CDT   Consents   Consent for Immunization Exchange :   Consent Granted   Consent for Immunizations to Providers :   Consent Granted   Melissa Lewis LPN - 8/13/2019 6:37 PM CDT   Meds / Allergies   (As Of: 8/13/2019 6:42:56 PM CDT)   Allergies (Active)   Augmentin  Estimated Onset Date:   Unspecified ; Reactions:   Hives ; Created By:   Marietta Nova CMA; Reaction Status:   Active ; Category:   Drug ; Substance:   Augmentin ; Type:   Allergy ; Updated By:   Marietta Nova CMA; Reviewed Date:   8/13/2019 6:39 PM CDT        Medication List   (As Of: 8/13/2019 6:42:56 PM CDT)   Prescription/Discharge Order    mirtazapine  :   mirtazapine ; Status:   Prescribed ; Ordered As Mnemonic:   mirtazapine 15 mg oral tablet ; Simple Display Line:   15 mg, 1 tab(s), PO, Once a day (at bedtime), 14  tab(s), 0 Refill(s) ; Ordering Provider:   Gosia Tracey MD; Catalog Code:   mirtazapine ; Order Dt/Tm:   8/5/2019 10:50:02 AM          venlafaxine  :   venlafaxine ; Status:   Prescribed ; Ordered As Mnemonic:   venlafaxine 75 mg oral capsule, extended release ; Simple Display Line:   75 mg, 1 cap(s), PO, Daily, 14 cap(s), 0 Refill(s) ; Ordering Provider:   Gosia Tracey MD; Catalog Code:   venlafaxine ; Order Dt/Tm:   8/5/2019 10:50:06 AM

## 2022-02-15 NOTE — NURSING NOTE
Comprehensive Intake Entered On:  6/3/2019 2:15 PM CDT    Performed On:  6/3/2019 2:11 PM CDT by Marietta Nova CMA               Summary   Chief Complaint :   Patient presents for depression medication check and renewal of medication.   Menstrual Status :   N/A   Weight Measured - Metric :   95 kg(Converted to: 209 lb 7 oz, 209.439 lb)    Height Measured - Metric :   171.45 cm(Converted to: 5 ft 7 in, 5.62 ft, 1.71 m)    Body Mass Index - Metric :   32.32 kg/m2   BSA - Metric :   2.13 m2   Systolic Blood Pressure :   120 mmHg   Diastolic Blood Pressure :   70 mmHg   Mean Arterial Pressure :   87 mmHg   Peripheral Pulse Rate :   84 bpm   BP Site :   Right arm   BP Method :   Manual   HR Method :   Electronic   Temperature Tympanic :   98.4 DegF(Converted to: 36.9 DegC)    Marietta Nova CMA - 6/3/2019 2:11 PM CDT   Health Status   Allergies Verified? :   Yes   Medication History Verified? :   Yes   Pre-Visit Planning Status :   Completed   Tobacco Use? :   Current some day smoker   Marietta Nova CMA - 6/3/2019 2:11 PM CDT   Consents   Consent for Immunization Exchange :   Consent Granted   Consent for Immunizations to Providers :   Consent Granted   Marietta Nova CMA - 6/3/2019 2:11 PM CDT   Meds / Allergies   (As Of: 6/3/2019 2:15:20 PM CDT)   Allergies (Active)   Augmentin  Estimated Onset Date:   Unspecified ; Reactions:   Hives ; Created By:   Marietta Nova CMA; Reaction Status:   Active ; Category:   Drug ; Substance:   Augmentin ; Type:   Allergy ; Updated By:   Marietta oNva CMA; Reviewed Date:   6/3/2019 2:14 PM CDT        Medication List   (As Of: 6/3/2019 2:15:20 PM CDT)   Prescription/Discharge Order    mirtazapine  :   mirtazapine ; Status:   Prescribed ; Ordered As Mnemonic:   mirtazapine 15 mg oral tablet ; Simple Display Line:   15 mg, 1 tab(s), PO, Once a day (at bedtime), 30 tab(s), 1 Refill(s) ; Ordering Provider:   Gosia Tracey MD; Catalog Code:   mirtazapine ; Order Dt/Tm:    3/19/2019 4:07:00 PM          venlafaxine  :   venlafaxine ; Status:   Prescribed ; Ordered As Mnemonic:   venlafaxine 75 mg oral capsule, extended release ; Simple Display Line:   75 mg, 1 cap(s), PO, Daily, 30 cap(s), 1 Refill(s) ; Ordering Provider:   Gosia Tracey MD; Catalog Code:   venlafaxine ; Order Dt/Tm:   3/19/2019 4:06:11 PM

## 2022-02-15 NOTE — NURSING NOTE
Comprehensive Intake Entered On:  1/27/2020 9:28 AM CST    Performed On:  1/27/2020 9:22 AM CST by Alma Valera CMA               Summary   Chief Complaint :   Pt here with injury while wrestling   Menstrual Status :   N/A   Weight Measured :   185 lb(Converted to: 185 lb 0 oz, 83.91 kg)    Height Measured :   68 in(Converted to: 5 ft 8 in, 172.72 cm)    Body Mass Index :   28.13 kg/m2   Body Surface Area :   2 m2   Systolic Blood Pressure :   122 mmHg   Diastolic Blood Pressure :   62 mmHg   Mean Arterial Pressure :   82 mmHg   Peripheral Pulse Rate :   75 bpm   Oxygen Saturation :   99 %   Alma Valera CMA - 1/27/2020 9:22 AM CST   Health Status   Allergies Verified? :   Yes   Medication History Verified? :   Yes   Medical History Verified? :   Yes   Pre-Visit Planning Status :   Completed   Tobacco Use? :   Never smoker   Alma Valera CMA - 1/27/2020 9:22 AM CST   Consents   Consent for Immunization Exchange :   Consent Granted   Consent for Immunizations to Providers :   Consent Granted   Alma Valera CMA - 1/27/2020 9:22 AM CST   Meds / Allergies   (As Of: 1/27/2020 9:28:47 AM CST)   Allergies (Active)   Augmentin  Estimated Onset Date:   Unspecified ; Reactions:   Hives ; Created By:   Marietta Nova CMA; Reaction Status:   Active ; Category:   Drug ; Substance:   Augmentin ; Type:   Allergy ; Updated By:   Marietta Nova CMA; Reviewed Date:   1/27/2020 9:24 AM CST        Medication List   (As Of: 1/27/2020 9:28:47 AM CST)   Prescription/Discharge Order    albuterol  :   albuterol ; Status:   Prescribed ; Ordered As Mnemonic:   ProAir HFA 90 mcg/inh inhalation aerosol ; Simple Display Line:   2 puff(s), inh, qid, 1 EA, 5 Refill(s) ; Ordering Provider:   Gerardo Yost MD; Catalog Code:   albuterol ; Order Dt/Tm:   12/11/2019 3:22:02 PM CST          buPROPion  :   buPROPion ; Status:   Prescribed ; Ordered As Mnemonic:   Wellbutrin  mg/24 hours oral tablet, extended release ; Simple  Display Line:   300 mg, 1 tab(s), Oral, q 24 hrs, 30 tab(s), 3 Refill(s) ; Ordering Provider:   Gosia Tracey MD; Catalog Code:   buPROPion ; Order Dt/Tm:   1/10/2020 2:01:39 PM CST          fluticasone-salmeterol  :   fluticasone-salmeterol ; Status:   Prescribed ; Ordered As Mnemonic:   Advair Diskus 250 mcg-50 mcg inhalation powder ; Simple Display Line:   1 puff(s), Inhale, bid, 180 EA, 2 Refill(s) ; Ordering Provider:   Gosia Tracey MD; Catalog Code:   fluticasone-salmeterol ; Order Dt/Tm:   1/10/2020 2:39:06 PM CST

## 2022-02-15 NOTE — LETTER
(Inserted Image. Unable to display)   July 02, 2019        MARY BETH DENSON   [place], [place] [id number]        Dear MARY BETH,      Thank you for selecting [facility] [facility] needs.    Our records indicate you are due for the following services:     Medication Check     To schedule an appointment or if you have further questions, please contact your primary clinic:   [facility]       [phone]   [facility]       [phone]              [facility]     [phone]      Powered by [facility] [employer] [facility]    Sincerely,    Gosia Tracey MD

## 2022-02-15 NOTE — NURSING NOTE
Comprehensive Intake Entered On:  11/14/2019 4:05 PM CST    Performed On:  11/14/2019 4:01 PM CST by Coral Garsia CMA               Summary   Chief Complaint :   Sports Exam     Menstrual Status :   N/A   Weight Measured :   192 lb(Converted to: 192 lb 0 oz, 87.09 kg)    Height Measured :   67.5 in(Converted to: 5 ft 7 in, 171.45 cm)    Body Mass Index :   29.62 kg/m2   Body Surface Area :   2.03 m2   Systolic Blood Pressure :   104 mmHg   Diastolic Blood Pressure :   70 mmHg   Mean Arterial Pressure :   81 mmHg   Peripheral Pulse Rate :   70 bpm   BP Site :   Left arm   Pulse Site :   Radial artery   BP Method :   Manual   HR Method :   Electronic   Temperature Tympanic :   98.8 DegF(Converted to: 37.1 DegC)    Oxygen Saturation :   97 %   Coral Garsia CMA - 11/14/2019 4:01 PM CST   Health Status   Allergies Verified? :   Yes   Medication History Verified? :   Yes   Medical History Verified? :   Yes   Pre-Visit Planning Status :   Completed   Tobacco Use? :   Former smoker   Coral Garsia CMA - 11/14/2019 4:01 PM CST   Consents   Consent for Immunization Exchange :   Consent Granted   Consent for Immunizations to Providers :   Consent Granted   Coral Garsia CMA - 11/14/2019 4:01 PM CST   Meds / Allergies   (As Of: 11/14/2019 4:06:00 PM CST)   Allergies (Active)   Augmentin  Estimated Onset Date:   Unspecified ; Reactions:   Hives ; Created By:   Marietta Nova CMA; Reaction Status:   Active ; Category:   Drug ; Substance:   Augmentin ; Type:   Allergy ; Updated By:   Marietta Nova CMA; Reviewed Date:   10/4/2019 2:26 PM CDT        Medication List   (As Of: 11/14/2019 4:06:00 PM CST)   Prescription/Discharge Order    betamethasone topical  :   betamethasone topical ; Status:   Completed ; Ordered As Mnemonic:   betamethasone valerate 0.1% topical cream ; Simple Display Line:   1 shantal, TOP, BID, 45 g, 1 Refill(s) ; Ordering Provider:   Gerardo Yost MD; Catalog Code:   betamethasone topical ; Order Dt/Tm:    10/1/2019 10:11:14 AM CDT          buPROPion  :   buPROPion ; Status:   Prescribed ; Ordered As Mnemonic:   Wellbutrin  mg/24 hours oral tablet, extended release ; Simple Display Line:   300 mg, 1 tab(s), Oral, q 24 hrs, 30 tab(s), 1 Refill(s) ; Ordering Provider:   Gosia Tracey MD; Catalog Code:   buPROPion ; Order Dt/Tm:   10/4/2019 2:49:02 PM CDT          hydrOXYzine  :   hydrOXYzine ; Status:   Prescribed ; Ordered As Mnemonic:   hydrOXYzine hydrochloride 25 mg oral tablet ; Simple Display Line:   25 mg, 1 tab(s), Oral, q4-6 hrs, PRN: as needed for itching, 25 tab(s), 1 Refill(s) ; Ordering Provider:   Gerardo Yost MD; Catalog Code:   hydrOXYzine ; Order Dt/Tm:   10/1/2019 10:10:25 AM CDT            Vision Testing POC   Corrective Lenses :   None   Eye, Left Visual Acuity :   20/30   Eye, Right Visual Acuity :   20/30   Eye, Bilateral Visual Acuity :   20/25   Coral Garsia CMA - 11/14/2019 4:01 PM CST

## 2022-02-15 NOTE — PROGRESS NOTES
Patient:   MARY BETH DENSON            MRN: [id number]            FIN: [id number]               Age:   19 years     Sex:  Male     :  2000   Associated Diagnoses:   Severe major depressive disorder; Immunization due   Author:   Gosia Tracey MD      Visit Information      Date of Service: 2019 06:36 pm  Performing Location: [facility]  Encounter#: [id number]      Primary Care Provider (PCP):  Gerardo Yost MD    NPI# [id number]      Referring Provider:  Gosia Tracey MD    NPI# [id number]      Chief Complaint   2019 6:37 PM CDT    med refill. could be better      History of Present Illness   Chief complaint and symptoms as noted above and confirmed with patient.  Here today alone.     Feels that meds do not do anything. Is having thoughts of being better off dead or hurting self. No specific plan. Feels he may tell someone if he felt he may hurt himself. he has called the suicide hotline before but feels its too much off of a script. feeling hopeless. Dose not really eat. When he does it is a little amount. Feels the Venlafaxine has never helped.  Still seeing a therapist. This is court ordered and usually they play poker.    Sleep- troubles falling asleep. The mirtazapine helped at first now does nothing. He states it does not make him tired after he takes it but in the morning has troubles waking up and makes him very drowsy. Has tried taking it at dinner time and still does not help him sleep.     smoking cigarettes daily, he feels that this makes him calmer.     Works a lot right now as a . Graduates from high school next year.           Review of Systems   All other systems are negative      Health Status   Allergies:    Allergic Reactions (Selected)  Severity Not Documented  Augmentin (Hives)   Medications:  (Selected)   Prescriptions  Prescribed  mirtazapine 15 mg oral tablet: = 1 tab(s) ( 15 mg ), PO, Once a day (at bedtime), # 14 tab(s), 0 Refill(s), Type:  Maintenance, Pharmacy: [facility], 1 tab(s) Oral hs  venlafaxine 75 mg oral capsule, extended release: = 1 cap(s) ( 75 mg ), PO, Daily, # 14 cap(s), 0 Refill(s), Type: Maintenance, Pharmacy: [facility], 1 cap(s) Oral daily,    Medications          *denotes recorded medication          mirtazapine 15 mg oral tablet: 15 mg, 1 tab(s), PO, Once a day (at bedtime), 14 tab(s), 0 Refill(s).          venlafaxine 75 mg oral capsule, extended release: 75 mg, 1 cap(s), PO, Daily, 14 cap(s), 0 Refill(s).       Problem list:    All Problems  Obesity / SNOMED CT 7159511348 / Probable  Severe major depressive disorder / SNOMED CT 7585760487 / Confirmed      Histories   Past Medical History:    No active or resolved past medical history items have been selected or recorded.   Family History:    No family history items have been selected or recorded.   Procedure history:    No active procedure history items have been selected or recorded.   Social History:             No active social history items have been recorded.      Physical Examination   Vital Signs   8/13/2019 6:37 PM CDT Peripheral Pulse Rate 114 bpm  HI    HR Method Electronic    Systolic Blood Pressure 116 mmHg    Diastolic Blood Pressure 80 mmHg    Mean Arterial Pressure 92 mmHg    BP Site Right arm    BP Method Manual    Oxygen Saturation 93 %  LOW      Measurements from flowsheet : Measurements   8/13/2019 6:37 PM CDT Height Measured - Metric 171.5 cm    Weight Measured - Metric 93.2 kg    BSA - Metric 2.11 m2    Body Mass Index - Metric 31.69 kg/m2    Body Mass Index Percentile 98.16      Vital signs as noted above   General:  Alert and oriented.    Psychiatric:  Cooperative, Appropriate mood & affect, Normal judgment, Non-suicidal.       Review / Management   PHQ-A: 15/27, questions 1 &2: 2 each, question 9: 1  WILLIE 7: 12      Impression and Plan   Diagnosis     Severe major depressive disorder (BPD70-IQ F32.2).     Immunization due (HHN07-CH Z23).      Plan:  Stop venlafaxine and Remeron.   Start wellbutrin XL 150mg daily.   Hydroxyzine prn panic attacks and PRN sleep issue.   Keep appointment with therapist next week- should inform him we have changed medications.   Reviewed ED for any suicidal thoughts.  Pt aware of suicide hotline.  HPV #2 given today.   RTC 4-6 weeks for follow up, sooner if needed. .    Orders     Orders (Selected)   Outpatient Orders  Completed  Gardasil 9: 0.5 mL, im, once  Prescriptions  Prescribed  Wellbutrin  mg/24 hours oral tablet, extended release: = 1 tab(s) ( 150 mg ), Oral, q 24 hrs, # 30 tab(s), 1 Refill(s), Type: Maintenance, Pharmacy: [facility], 1 tab(s) Oral q 24 hrs  hydrOXYzine hydrochloride 25 mg oral tablet: See Instructions, Instructions: 1 tab by mouth PRN for panic attack.  1-2 tabs by mouth at bedtime PRN.  Do not exceed more than 4 tabs in a 24 hr peroid., PRN: for anxiety, # 40 tab(s), 1 Refill(s), Type: Maintenance, Pharmacy: [facility], 1....

## 2022-02-15 NOTE — LETTER
(Inserted Image. Unable to display)     April 22, 2019      MARY BETH DENSON   [place], [place] [id number]          Dear MARY BETH,      Thank you for selecting [facility] (previously [place] [place] [facility]) for your healthcare needs.      Our records indicate you are due for the following services:     Follow-up office visit.      To schedule an appointment or if you have further questions, please contact your primary clinic:   [facility]       [phone]   [facility]       [phone]              [facility]     [phone]      Powered by [employer]    Sincerely,    Gosia Tracey MD

## 2022-02-15 NOTE — NURSING NOTE
Depression Screening Entered On:  3/22/2019 11:35 AM CDT    Performed On:  3/22/2019 11:34 AM CDT by Chapis Felix CMA               Depression Screening   Feeling Down, Depressed, Hopeless :   More than half the days   Little Interest - Pleasure in Activities :   More than half the days   Initial Depression Screen Score :   4    Trouble Falling or Staying Asleep :   Nearly every day   Feeling Tired or Little Energy :   Nearly every day   Poor Appetite or Overeating :   More than half the days   Feeling Bad About Yourself :   Nearly every day   Trouble Concentrating :   Nearly every day   Moving or Speaking Slowly :   Several days   Thoughts Better Off Dead or Hurting Self :   More than half the days   Detailed Depression Screen Score :   17    Total Depression Screen Score :   21    Depression Screening Comment :   PHQ-A child age 11-17   Chapis Felix CMA - 3/22/2019 11:34 AM CDT

## 2022-02-15 NOTE — LETTER
(Inserted Image. Unable to display)     November 16, 2020      MARY BETH DENSON   [place], [place] [id number]          Dear MARY BETH,      Thank you for selecting [facility] (previously [place] [place] [facility]) for your healthcare needs.    Our records indicate you are due for the following services:     Annual Physical.    (FYI   Regarding office visits: In some instances, a video visit or telephone visit may be offered as an option.)      To schedule an appointment or if you have further questions, please contact your primary clinic:   [facility]       [phone]   [facility]       [phone]              [facility]     [phone]      Powered by [employer]    Sincerely,    Gerardo Yost MD

## 2022-02-15 NOTE — PROGRESS NOTES
Patient:   MARY BETH DENSON            MRN: [id number]            FIN: [id number]               Age:   18 years     Sex:  Male     :  2000   Associated Diagnoses:   Immunization due; Depression, acute; Adjustment disorder of adolescence   Author:   Gosia Tracey MD      Chief Complaint   2019 2:00 PM CST    Patient presents for depression medication check.      History of Present Illness   Chief complaint and symptoms as noted above and confirmed with patient.  Here today for recheck of depression.      Has not been taking his fluoxetine.  Not on it for a long time.  Seemed to work a bit in the beginning.  Did not continue because it was not helping.    Working more now.  Studying for the ACT.  Both are contributing to stress level.  GF recently broke up with him.  Is doing some therapy.  Saul from [facility].  Went once to his office and now he comes to the school.  Has missed several sessions because of the school closings.  Does feel this is helping.  Feels his anxiety is the worst.      Not sleeping well.  Tries to sleep but cannot fall asleep.  Tried the melatonin and Trazodone but couldn't wake up to his alarm int eh morning on the Trazodone.    Tries to go to bed at 10:30 or 11.  Once he falls asleep is able to stay asleep.     When asked about suicidal ideation- has had some thoughts.  Would look in his mother's room for his gun that she took away from him.        Review of Systems   All other systems are negative      Health Status   Allergies:    Allergic Reactions (Selected)  Severity Not Documented  Augmentin (Hives)   Medications:  (Selected)   Prescriptions  Prescribed  FLUoxetine 10 mg oral capsule: = 1 cap(s) ( 10 mg ), PO, Daily, # 30 cap(s), 1 Refill(s), Type: Maintenance, Pharmacy: [facility], 1 cap(s) Oral daily  FLUoxetine 20 mg oral capsule: 1 cap(s) ( 20 mg ), PO, Daily, # 30 cap(s), 1 Refill(s), Type: Maintenance, Pharmacy: [facility], 1 cap(s) Oral daily  Zofran ODT  4 mg oral tablet, disintegrating: = 1 tab(s) ( 4 mg ), po, q8 hrs, PRN: as needed for nausea/vomiting, # 15 tab(s), 0 Refill(s), Type: Maintenance, Pharmacy: [facility], 1 tab(s) Oral q8 hrs,PRN:as needed for nausea/vomiting  doxycycline hyclate 100 mg oral tablet: = 1 tab(s) ( 100 mg ), PO, Daily, # 30 tab(s), 2 Refill(s), Type: Maintenance, Pharmacy: [facility], 1 tab(s) Oral daily,x30 day(s)  traZODone 50 mg oral tablet: = 1 tab(s) ( 50 mg ), PO, hs, # 30 tab(s), 1 Refill(s), Type: Maintenance, Pharmacy: [facility], 1 tab(s) Oral hs  triamcinolone 0.025% topical cream: 1 shantal, TOP, bid, Instructions: apply a thin film  to affected area, PRN: eczema, # 60 gm, 0 Refill(s), Type: Maintenance, Pharmacy: [facility], 1 shantal Topical bid,PRN:eczema,Instr:apply a thin film; to affected area   Problem list:    All Problems  Obesity / [id number] / Probable      Histories   Past Medical History:    No active or resolved past medical history items have been selected or recorded.   Family History:    No family history items have been selected or recorded.   Procedure history:    No active procedure history items have been selected or recorded.   Social History:             No active social history items have been recorded.      Physical Examination   Vital Signs   2/22/2019 2:00 PM CST Temperature Tympanic 97.8 DegF  LOW    Peripheral Pulse Rate 71 bpm    HR Method Electronic    Systolic Blood Pressure 110 mmHg    Diastolic Blood Pressure 70 mmHg    Mean Arterial Pressure 83 mmHg    BP Site Right arm    BP Method Manual    Oxygen Saturation 98 %      Measurements from flowsheet : Measurements   2/22/2019 2:00 PM CST Height Measured - Metric 171.45 cm    Weight Measured - Metric 90.4 kg    BSA - Metric 2.07 m2    Body Mass Index - Metric 30.75 kg/m2    Body Mass Index Percentile 97.78      Vital signs as noted above   General:  Alert and oriented, No acute distress.    Psychiatric:  Cooperative,  Flat affect, somewhat poor eye contact.       Review / Management   PHQ 8: 17/27.  Question 1: 3, 2: 2, 9: 3  Robinson 7: 13 total, somewhat difficult.      Impression and Plan   Diagnosis     Immunization due (ILU69-QA Z23).     Depression, acute (UDT50-UD F32.9).     Adjustment disorder of adolescence (AGR73-WE F43.20).     Plan:  Encouraged him to continue with his counseling.  Recommend mom take all firearms out of the home.  She should lock all medications or keep them up high.  Start Zoloft 25 mg once daily.  Discussed taking the fluoxetine out of the home.  Keep trazodone as we may add this in the future.  HPV #2 given today.  Return to clinic in 3 weeks, sooner if needed..    Orders     Orders (Selected)   Outpatient Orders  Completed  Gardasil 9: 0.5 mL, IM, once  Prescriptions  Prescribed  Zoloft 25 mg oral tablet: = 1 tab(s) ( 25 mg ), PO, Daily, # 30 tab(s), 0 Refill(s), Type: Maintenance, Pharmacy: [facility], 1 tab(s) Oral daily.